# Patient Record
Sex: MALE | Race: WHITE | Employment: OTHER | URBAN - METROPOLITAN AREA
[De-identification: names, ages, dates, MRNs, and addresses within clinical notes are randomized per-mention and may not be internally consistent; named-entity substitution may affect disease eponyms.]

---

## 2020-02-17 ENCOUNTER — APPOINTMENT (OUTPATIENT)
Dept: MRI IMAGING | Facility: HOSPITAL | Age: 70
DRG: 037 | End: 2020-02-17
Attending: EMERGENCY MEDICINE
Payer: MEDICARE

## 2020-02-17 ENCOUNTER — HOSPITAL ENCOUNTER (INPATIENT)
Facility: HOSPITAL | Age: 70
LOS: 7 days | Discharge: HOME | DRG: 037 | End: 2020-02-25
Attending: EMERGENCY MEDICINE | Admitting: HOSPITALIST
Payer: MEDICARE

## 2020-02-17 ENCOUNTER — HOSPITAL ENCOUNTER (OUTPATIENT)
Age: 70
Discharge: ANOTHER HOSPITAL | DRG: 037 | End: 2020-02-17
Payer: MEDICARE

## 2020-02-17 VITALS
OXYGEN SATURATION: 95 % | DIASTOLIC BLOOD PRESSURE: 78 MMHG | WEIGHT: 180 LBS | HEART RATE: 69 BPM | TEMPERATURE: 99 F | HEIGHT: 68 IN | SYSTOLIC BLOOD PRESSURE: 136 MMHG | RESPIRATION RATE: 18 BRPM | BODY MASS INDEX: 27.28 KG/M2

## 2020-02-17 DIAGNOSIS — I63.511 ACUTE ISCHEMIC RIGHT MCA STROKE (HCC): Primary | ICD-10-CM

## 2020-02-17 DIAGNOSIS — R41.0 ACUTE CONFUSION: ICD-10-CM

## 2020-02-17 DIAGNOSIS — R41.0 DISORIENTATED: ICD-10-CM

## 2020-02-17 DIAGNOSIS — R41.82 ALTERED MENTAL STATUS, UNSPECIFIED ALTERED MENTAL STATUS TYPE: Primary | ICD-10-CM

## 2020-02-17 DIAGNOSIS — R53.83 LETHARGIC: ICD-10-CM

## 2020-02-17 PROBLEM — R39.11 BENIGN PROSTATIC HYPERPLASIA WITH URINARY HESITANCY: Chronic | Status: ACTIVE | Noted: 2020-02-17

## 2020-02-17 PROBLEM — N40.1 BENIGN PROSTATIC HYPERPLASIA WITH URINARY HESITANCY: Chronic | Status: ACTIVE | Noted: 2020-02-17

## 2020-02-17 LAB
ALBUMIN SERPL-MCNC: 3.6 G/DL (ref 3.4–5)
ALBUMIN/GLOB SERPL: 0.9 {RATIO} (ref 1–2)
ALP LIVER SERPL-CCNC: 77 U/L (ref 45–117)
ALT SERPL-CCNC: 21 U/L (ref 16–61)
ANION GAP SERPL CALC-SCNC: 2 MMOL/L (ref 0–18)
APTT PPP: 25.8 SECONDS (ref 25.4–36.1)
AST SERPL-CCNC: 16 U/L (ref 15–37)
ATRIAL RATE: 57 BPM
BASOPHILS # BLD AUTO: 0.02 X10(3) UL (ref 0–0.2)
BASOPHILS NFR BLD AUTO: 0.3 %
BILIRUB SERPL-MCNC: 0.6 MG/DL (ref 0.1–2)
BUN BLD-MCNC: 15 MG/DL (ref 7–18)
BUN/CREAT SERPL: 16.5 (ref 10–20)
CALCIUM BLD-MCNC: 8.9 MG/DL (ref 8.5–10.1)
CHLORIDE SERPL-SCNC: 109 MMOL/L (ref 98–112)
CHOLEST SMN-MCNC: 180 MG/DL (ref ?–200)
CO2 SERPL-SCNC: 30 MMOL/L (ref 21–32)
CREAT BLD-MCNC: 0.91 MG/DL (ref 0.7–1.3)
DEPRECATED RDW RBC AUTO: 39.6 FL (ref 35.1–46.3)
EOSINOPHIL # BLD AUTO: 0.11 X10(3) UL (ref 0–0.7)
EOSINOPHIL NFR BLD AUTO: 1.7 %
ERYTHROCYTE [DISTWIDTH] IN BLOOD BY AUTOMATED COUNT: 11.8 % (ref 11–15)
EST. AVERAGE GLUCOSE BLD GHB EST-MCNC: 114 MG/DL (ref 68–126)
ETHANOL SERPL-MCNC: <3 MG/DL (ref ?–3)
GLOBULIN PLAS-MCNC: 3.8 G/DL (ref 2.8–4.4)
GLUCOSE BLD-MCNC: 145 MG/DL (ref 70–99)
GLUCOSE BLD-MCNC: 87 MG/DL (ref 70–99)
HAV IGM SER QL: 2.2 MG/DL (ref 1.6–2.6)
HBA1C MFR BLD HPLC: 5.6 % (ref ?–5.7)
HCT VFR BLD AUTO: 43.6 % (ref 39–53)
HDLC SERPL-MCNC: 38 MG/DL (ref 40–59)
HGB BLD-MCNC: 14.8 G/DL (ref 13–17.5)
IMM GRANULOCYTES # BLD AUTO: 0.02 X10(3) UL (ref 0–1)
IMM GRANULOCYTES NFR BLD: 0.3 %
INR BLD: 0.95 (ref 0.9–1.1)
LDLC SERPL CALC-MCNC: 117 MG/DL (ref ?–100)
LYMPHOCYTES # BLD AUTO: 1.51 X10(3) UL (ref 1–4)
LYMPHOCYTES NFR BLD AUTO: 23.7 %
M PROTEIN MFR SERPL ELPH: 7.4 G/DL (ref 6.4–8.2)
MCH RBC QN AUTO: 30.8 PG (ref 26–34)
MCHC RBC AUTO-ENTMCNC: 33.9 G/DL (ref 31–37)
MCV RBC AUTO: 90.8 FL (ref 80–100)
MONOCYTES # BLD AUTO: 0.73 X10(3) UL (ref 0.1–1)
MONOCYTES NFR BLD AUTO: 11.5 %
NEUTROPHILS # BLD AUTO: 3.97 X10 (3) UL (ref 1.5–7.7)
NEUTROPHILS # BLD AUTO: 3.97 X10(3) UL (ref 1.5–7.7)
NEUTROPHILS NFR BLD AUTO: 62.5 %
NONHDLC SERPL-MCNC: 142 MG/DL (ref ?–130)
OSMOLALITY SERPL CALC.SUM OF ELEC: 292 MOSM/KG (ref 275–295)
P AXIS: 29 DEGREES
P-R INTERVAL: 148 MS
PLATELET # BLD AUTO: 224 10(3)UL (ref 150–450)
POTASSIUM SERPL-SCNC: 4.1 MMOL/L (ref 3.5–5.1)
PSA SERPL DL<=0.01 NG/ML-MCNC: 13.1 SECONDS (ref 12.5–14.7)
Q-T INTERVAL: 436 MS
QRS DURATION: 104 MS
QTC CALCULATION (BEZET): 424 MS
R AXIS: 44 DEGREES
RBC # BLD AUTO: 4.8 X10(6)UL (ref 3.8–5.8)
SODIUM SERPL-SCNC: 141 MMOL/L (ref 136–145)
T AXIS: 20 DEGREES
TRIGL SERPL-MCNC: 125 MG/DL (ref 30–149)
TROPONIN I SERPL-MCNC: <0.045 NG/ML (ref ?–0.04)
VENTRICULAR RATE: 57 BPM
VLDLC SERPL CALC-MCNC: 25 MG/DL (ref 0–30)
WBC # BLD AUTO: 6.4 X10(3) UL (ref 4–11)

## 2020-02-17 PROCEDURE — 70546 MR ANGIOGRAPH HEAD W/O&W/DYE: CPT | Performed by: EMERGENCY MEDICINE

## 2020-02-17 PROCEDURE — A9575 INJ GADOTERATE MEGLUMI 0.1ML: HCPCS

## 2020-02-17 PROCEDURE — 70549 MR ANGIOGRAPH NECK W/O&W/DYE: CPT | Performed by: EMERGENCY MEDICINE

## 2020-02-17 PROCEDURE — 99203 OFFICE O/P NEW LOW 30 MIN: CPT

## 2020-02-17 PROCEDURE — 70553 MRI BRAIN STEM W/O & W/DYE: CPT | Performed by: EMERGENCY MEDICINE

## 2020-02-17 PROCEDURE — 99223 1ST HOSP IP/OBS HIGH 75: CPT | Performed by: HOSPITALIST

## 2020-02-17 PROCEDURE — 99202 OFFICE O/P NEW SF 15 MIN: CPT

## 2020-02-17 RX ORDER — ONDANSETRON 2 MG/ML
4 INJECTION INTRAMUSCULAR; INTRAVENOUS EVERY 6 HOURS PRN
Status: DISCONTINUED | OUTPATIENT
Start: 2020-02-17 | End: 2020-02-20

## 2020-02-17 RX ORDER — FAMOTIDINE 20 MG/1
20 TABLET ORAL DAILY
Status: DISCONTINUED | OUTPATIENT
Start: 2020-02-17 | End: 2020-02-20

## 2020-02-17 RX ORDER — METOCLOPRAMIDE HYDROCHLORIDE 5 MG/ML
10 INJECTION INTRAMUSCULAR; INTRAVENOUS EVERY 8 HOURS PRN
Status: DISCONTINUED | OUTPATIENT
Start: 2020-02-17 | End: 2020-02-25

## 2020-02-17 RX ORDER — ATORVASTATIN CALCIUM 80 MG/1
80 TABLET, FILM COATED ORAL NIGHTLY
Status: DISCONTINUED | OUTPATIENT
Start: 2020-02-17 | End: 2020-02-25

## 2020-02-17 RX ORDER — ACETAMINOPHEN 650 MG/1
650 SUPPOSITORY RECTAL EVERY 4 HOURS PRN
Status: DISCONTINUED | OUTPATIENT
Start: 2020-02-17 | End: 2020-02-25

## 2020-02-17 RX ORDER — DUTASTERIDE 0.5 MG/1
0.5 CAPSULE, LIQUID FILLED ORAL DAILY
COMMUNITY
End: 2020-03-16

## 2020-02-17 RX ORDER — SODIUM CHLORIDE 9 MG/ML
INJECTION, SOLUTION INTRAVENOUS CONTINUOUS
Status: ACTIVE | OUTPATIENT
Start: 2020-02-17 | End: 2020-02-17

## 2020-02-17 RX ORDER — BISACODYL 10 MG
10 SUPPOSITORY, RECTAL RECTAL
Status: DISCONTINUED | OUTPATIENT
Start: 2020-02-17 | End: 2020-02-25

## 2020-02-17 RX ORDER — ASPIRIN 300 MG
300 SUPPOSITORY, RECTAL RECTAL DAILY
Status: DISCONTINUED | OUTPATIENT
Start: 2020-02-17 | End: 2020-02-25

## 2020-02-17 RX ORDER — HEPARIN SODIUM 5000 [USP'U]/ML
5000 INJECTION, SOLUTION INTRAVENOUS; SUBCUTANEOUS EVERY 8 HOURS SCHEDULED
Status: DISCONTINUED | OUTPATIENT
Start: 2020-02-17 | End: 2020-02-20

## 2020-02-17 RX ORDER — ASPIRIN 325 MG
325 TABLET ORAL DAILY
Status: DISCONTINUED | OUTPATIENT
Start: 2020-02-17 | End: 2020-02-25

## 2020-02-17 RX ORDER — LABETALOL HYDROCHLORIDE 5 MG/ML
10 INJECTION, SOLUTION INTRAVENOUS EVERY 10 MIN PRN
Status: DISCONTINUED | OUTPATIENT
Start: 2020-02-17 | End: 2020-02-25

## 2020-02-17 RX ORDER — ACETAMINOPHEN 325 MG/1
650 TABLET ORAL EVERY 4 HOURS PRN
Status: DISCONTINUED | OUTPATIENT
Start: 2020-02-17 | End: 2020-02-25

## 2020-02-17 RX ORDER — POLYETHYLENE GLYCOL 3350 17 G/17G
17 POWDER, FOR SOLUTION ORAL DAILY PRN
Status: DISCONTINUED | OUTPATIENT
Start: 2020-02-17 | End: 2020-02-25

## 2020-02-17 RX ORDER — FAMOTIDINE 10 MG/ML
20 INJECTION, SOLUTION INTRAVENOUS DAILY
Status: DISCONTINUED | OUTPATIENT
Start: 2020-02-17 | End: 2020-02-20

## 2020-02-17 RX ORDER — FINASTERIDE 5 MG/1
5 TABLET, FILM COATED ORAL DAILY
Status: DISCONTINUED | OUTPATIENT
Start: 2020-02-17 | End: 2020-02-25

## 2020-02-17 RX ORDER — SODIUM PHOSPHATE, DIBASIC AND SODIUM PHOSPHATE, MONOBASIC 7; 19 G/133ML; G/133ML
1 ENEMA RECTAL ONCE AS NEEDED
Status: DISCONTINUED | OUTPATIENT
Start: 2020-02-17 | End: 2020-02-25

## 2020-02-17 RX ORDER — ACETAMINOPHEN 325 MG/1
650 TABLET ORAL EVERY 6 HOURS PRN
Status: DISCONTINUED | OUTPATIENT
Start: 2020-02-17 | End: 2020-02-17

## 2020-02-17 RX ORDER — ASPIRIN 81 MG/1
324 TABLET, CHEWABLE ORAL ONCE
Status: COMPLETED | OUTPATIENT
Start: 2020-02-17 | End: 2020-02-17

## 2020-02-17 RX ORDER — MULTIVITAMIN WITH FOLIC ACID 400 MCG
1 TABLET ORAL DAILY
COMMUNITY

## 2020-02-17 RX ORDER — ASPIRIN 81 MG/1
81 TABLET ORAL DAILY
Status: ON HOLD | COMMUNITY
End: 2020-02-25

## 2020-02-17 RX ORDER — DOCUSATE SODIUM 100 MG/1
100 CAPSULE, LIQUID FILLED ORAL 2 TIMES DAILY
Status: DISCONTINUED | OUTPATIENT
Start: 2020-02-17 | End: 2020-02-25

## 2020-02-17 RX ORDER — SODIUM CHLORIDE 9 MG/ML
INJECTION, SOLUTION INTRAVENOUS CONTINUOUS
Status: ACTIVE | OUTPATIENT
Start: 2020-02-17 | End: 2020-02-19

## 2020-02-17 NOTE — ED PROVIDER NOTES
The patient's case was discussed with me by JEANNETTE Torres. I interviewed and examined the patient.   Neurologic exam is completely normal but the patient clearly has a history of abnormal mentation yesterday and family states that he is still not back Aspirin was administered. Test results were discussed with Dr. Viviana Ulrich. Patient and family members were apprised of the test results. Patient was transferred to the floor.

## 2020-02-17 NOTE — ED INITIAL ASSESSMENT (HPI)
Wife noted that patient was lethargic, disoriented, confused yesterday. Flew in from [place] last night. Wife states he does not appear to be himself. Patient alert and able to answer questions.

## 2020-02-17 NOTE — ED PROVIDER NOTES
Patient Seen in: [facility] Emergency Department      History   Patient presents with:  Altered Mental Status    Stated Complaint: AMS fro IC, confusion since yesterday, sent to r/o TIA    HPI  46-year-old male who comes in today with his wife and s Drug use: Not on file             Review of Systems   All other systems reviewed and are negative. Positive for stated complaint: AMS fro IC, confusion since yesterday, sent to r/o TIA  Other systems are as noted in HPI.   Constitutional and vital sign deficit. Sensory: No sensory deficit. Motor: No weakness. Coordination: Coordination normal.      Gait: Gait normal.      Deep Tendon Reflexes: Reflexes normal.      Comments: Finger to nose and heel to shin intact. No facial droop.   No ton without and with infusion was performed using 3D time of flight, multi-planar and 3D reconstructed images. All measurements obtained in this exam were performed using NASCET criteria.   PATIENT STATED HISTORY:(As transcribed by Technologist)  Patient had an vertebral artery appears unremarkable. MRA NECK COMMON CAROTID:                 Normal for age with no visible significant stenosis or dissection.  CERVICAL INTERNAL CAROTID:  There is a focus of complete occlusion involving the right proximal internal car Asa given. Disposition and Plan     Clinical Impression:  Acute confusion  (primary encounter diagnosis)    Disposition:  Admit  2/17/2020  2:55 pm    Follow-up:  No follow-up provider specified.       Medications Prescribed:  Current Discharge M

## 2020-02-17 NOTE — ED INITIAL ASSESSMENT (HPI)
A/o x3, pt ambulatory, per family had three episodes of confusion, yesterday, per family better today but IC personal recommended neurological exams to ER.

## 2020-02-17 NOTE — ED PROVIDER NOTES
Patient Seen in: [facility] [place]      History   Patient presents with:  Mental Status Changes    Stated Complaint: disoriented    HPI    80-year-old male who comes in today with his wife and son-in-law with complaints of waking up ye Smoking status: Never Smoker      Smokeless tobacco: Never Used    Alcohol use: Not on file    Drug use: Not on file             Review of Systems    Positive for stated complaint: disoriented  Other systems are as noted in HPI.   Constitutional and vital s Sensory: Sensation is intact. Motor: Motor function is intact. Coordination: Coordination is intact. Gait: Gait is intact. Deep Tendon Reflexes: Reflexes are normal and symmetric.       Comments: Coordination and cerebellar testing is in

## 2020-02-17 NOTE — ED NOTES
Per family, pt has returned to his mental baseline and has not changed from such since his hospital stay today. He is alert and oriented x4 at this time.

## 2020-02-18 ENCOUNTER — APPOINTMENT (OUTPATIENT)
Dept: CV DIAGNOSTICS | Facility: HOSPITAL | Age: 70
DRG: 037 | End: 2020-02-18
Attending: PHYSICIAN ASSISTANT
Payer: MEDICARE

## 2020-02-18 ENCOUNTER — APPOINTMENT (OUTPATIENT)
Dept: CT IMAGING | Facility: HOSPITAL | Age: 70
DRG: 037 | End: 2020-02-18
Attending: HOSPITALIST
Payer: MEDICARE

## 2020-02-18 ENCOUNTER — APPOINTMENT (OUTPATIENT)
Dept: CV DIAGNOSTICS | Facility: HOSPITAL | Age: 70
DRG: 037 | End: 2020-02-18
Attending: HOSPITALIST
Payer: MEDICARE

## 2020-02-18 LAB
ANION GAP SERPL CALC-SCNC: 3 MMOL/L (ref 0–18)
BASOPHILS # BLD AUTO: 0.02 X10(3) UL (ref 0–0.2)
BASOPHILS NFR BLD AUTO: 0.3 %
BUN BLD-MCNC: 13 MG/DL (ref 7–18)
BUN/CREAT SERPL: 14.6 (ref 10–20)
CALCIUM BLD-MCNC: 8.4 MG/DL (ref 8.5–10.1)
CHLORIDE SERPL-SCNC: 110 MMOL/L (ref 98–112)
CO2 SERPL-SCNC: 29 MMOL/L (ref 21–32)
CREAT BLD-MCNC: 0.89 MG/DL (ref 0.7–1.3)
DEPRECATED RDW RBC AUTO: 40.6 FL (ref 35.1–46.3)
EOSINOPHIL # BLD AUTO: 0.18 X10(3) UL (ref 0–0.7)
EOSINOPHIL NFR BLD AUTO: 2.9 %
ERYTHROCYTE [DISTWIDTH] IN BLOOD BY AUTOMATED COUNT: 11.9 % (ref 11–15)
GLUCOSE BLD-MCNC: 100 MG/DL (ref 70–99)
GLUCOSE BLD-MCNC: 110 MG/DL (ref 70–99)
GLUCOSE BLD-MCNC: 116 MG/DL (ref 70–99)
GLUCOSE BLD-MCNC: 84 MG/DL (ref 70–99)
GLUCOSE BLD-MCNC: 99 MG/DL (ref 70–99)
HCT VFR BLD AUTO: 38.5 % (ref 39–53)
HGB BLD-MCNC: 12.9 G/DL (ref 13–17.5)
IMM GRANULOCYTES # BLD AUTO: 0.02 X10(3) UL (ref 0–1)
IMM GRANULOCYTES NFR BLD: 0.3 %
LYMPHOCYTES # BLD AUTO: 1.58 X10(3) UL (ref 1–4)
LYMPHOCYTES NFR BLD AUTO: 25.5 %
MCH RBC QN AUTO: 31 PG (ref 26–34)
MCHC RBC AUTO-ENTMCNC: 33.5 G/DL (ref 31–37)
MCV RBC AUTO: 92.5 FL (ref 80–100)
MONOCYTES # BLD AUTO: 0.73 X10(3) UL (ref 0.1–1)
MONOCYTES NFR BLD AUTO: 11.8 %
NEUTROPHILS # BLD AUTO: 3.66 X10 (3) UL (ref 1.5–7.7)
NEUTROPHILS # BLD AUTO: 3.66 X10(3) UL (ref 1.5–7.7)
NEUTROPHILS NFR BLD AUTO: 59.2 %
OSMOLALITY SERPL CALC.SUM OF ELEC: 295 MOSM/KG (ref 275–295)
PLATELET # BLD AUTO: 209 10(3)UL (ref 150–450)
POTASSIUM SERPL-SCNC: 4.1 MMOL/L (ref 3.5–5.1)
RBC # BLD AUTO: 4.16 X10(6)UL (ref 3.8–5.8)
SODIUM SERPL-SCNC: 142 MMOL/L (ref 136–145)
WBC # BLD AUTO: 6.2 X10(3) UL (ref 4–11)

## 2020-02-18 PROCEDURE — 93306 TTE W/DOPPLER COMPLETE: CPT | Performed by: PHYSICIAN ASSISTANT

## 2020-02-18 PROCEDURE — 70496 CT ANGIOGRAPHY HEAD: CPT | Performed by: HOSPITALIST

## 2020-02-18 PROCEDURE — 70498 CT ANGIOGRAPHY NECK: CPT | Performed by: HOSPITALIST

## 2020-02-18 PROCEDURE — 99232 SBSQ HOSP IP/OBS MODERATE 35: CPT | Performed by: HOSPITALIST

## 2020-02-18 PROCEDURE — 99221 1ST HOSP IP/OBS SF/LOW 40: CPT | Performed by: INTERNAL MEDICINE

## 2020-02-18 PROCEDURE — 99223 1ST HOSP IP/OBS HIGH 75: CPT | Performed by: OTHER

## 2020-02-18 RX ORDER — CLOPIDOGREL BISULFATE 75 MG/1
75 TABLET ORAL DAILY
Status: DISCONTINUED | OUTPATIENT
Start: 2020-02-18 | End: 2020-02-20

## 2020-02-18 NOTE — PLAN OF CARE
Received patient via cart from the ED with AMS that resolved. Alert and oriented x4, wife at bedside. Telemetry monitor reading SB. IVF started. Neuro checks with no deficits. Bedrest. Passed bedside swallow test, medications given.  Plan for Carotid CT and

## 2020-02-18 NOTE — CONSULTS
[address]  Neurology Consultation    Date of consult: 2/18/2020    Reason for consult: stroke    HPI: Bari Naidu is a 78year old male with past medical history as listed below presents with confusion and lethargy.   Patient was out of mg, Intravenous, Q10 Min PRN  atorvastatin (LIPITOR) tab 80 mg, 80 mg, Oral, Nightly  aspirin 300 MG rectal suppository 300 mg, 300 mg, Rectal, Daily    Or  aspirin tab 325 mg, 325 mg, Oral, Daily  famoTIDine (PEPCID) tab 20 mg, 20 mg, Oral, Daily    Or  f 02/18/20  0549   RBC 4.16   HGB 12.9*   HCT 38.5*   MCV 92.5   MCH 31.0   MCHC 33.5   RDW 11.9   NEPRELIM 3.66   WBC 6.2   .0     Recent Labs   Lab 02/17/20  1407 02/18/20  0549   GLU 87 110*   BUN 15 13   CREATSERUM 0.91 0.89   GFRAA 100 102   GFRN

## 2020-02-18 NOTE — CONSULTS
[facility]  Vascular Surgery Consultation    [facility] Moritz Briestensky Patient Status:  Inpatient    1950 MRN [id number]   [facility] 7NE-A Attending Joyce Lopes [address] Day # 0 PCP No primary care provider on file.      Re 1 enema, Rectal, Once PRN  •  0.9% NaCl infusion, , Intravenous, Continuous  •  acetaminophen (TYLENOL) tab 650 mg, 650 mg, Oral, Q4H PRN **OR** acetaminophen (TYLENOL) 650 MG rectal suppository 650 mg, 650 mg, Rectal, Q4H PRN  •  Labetalol HCl (TRANDATE) affect, no sensory or motor deficit        Laboratory Data:  Lab Results   Component Value Date    WBC 6.2 02/18/2020    HGB 12.9 02/18/2020    HCT 38.5 02/18/2020    .0 02/18/2020    CREATSERUM 0.89 02/18/2020    BUN 13 02/18/2020     02/18/2

## 2020-02-18 NOTE — H&P
[facility]  History and Physical     Eleni Valentin Patient Status:  Observation    1950 MRN [id number]   [facility] 7NE-A Attending Kaia Ferreira [address] Day # 0 PCP No primary care provider on file.      Brad Rodrigez daily., Disp: , Rfl:   Multiple Vitamin (TAB-A-DAVE) Oral Tab, Take 1 tablet by mouth daily. , Disp: , Rfl:         Review of Systems:   A comprehensive 14 point review of systems was completed. Pertinent positives and negatives noted in the HPI.     Phys aspirin, statin, echo ordered. 2. Right internal carotid artery occlusion-we will order CTA of head and neck. 3. BPH-we will continue on home medication.     Quality:  · DVT Prophylaxis: Heparin  · CODE status: Full  · Olmedo: None    Plan of care discusse

## 2020-02-18 NOTE — SLP NOTE
ADULT SWALLOWING EVALUATION    ASSESSMENT    ASSESSMENT/OVERALL IMPRESSION:  Pt seen this morning for BSSE per stroke protocol. Nurse approved this evaluation. Pt's wife was present.  Pt is a 79 y/o M who was admitted to [facility] on 2/17/2020 after e diet.     * ST to f/u with pt to complete cognitive-communication evaluation per stroke protocol.     Compensatory Strategies Recommended: (N/A)  Aspiration Precautions: (N/A)  Medication Administration Recommendations: No restrictions     Treatment Plan/Re Dr. Heydi De Anda on 2/17/2020 at 1735 hours. Read back was performed. \"    OBJECTIVE   ORAL MOTOR EXAMINATION  Dentition: Natural  Symmetry: Within Functional Limits  Strength:  Within Functional Limits  Tone: Within Functional Limits  Range of Motion: Within

## 2020-02-18 NOTE — CM/SW NOTE
Pt admitted for stroke. Pt on CVA protocol. Pt lives in [place] and was visiting his dtr here in [place]. Pt has no deficits from CVA. PT/OT to see. No needs anticipated.

## 2020-02-18 NOTE — ED NOTES
Report called to Terrence Wright RN [id number] for room 7756. Will hold patient until inpatient room is ready.

## 2020-02-19 ENCOUNTER — ANESTHESIA EVENT (OUTPATIENT)
Dept: CARDIAC SURGERY | Facility: HOSPITAL | Age: 70
DRG: 037 | End: 2020-02-19
Payer: MEDICARE

## 2020-02-19 LAB
GLUCOSE BLD-MCNC: 101 MG/DL (ref 70–99)
GLUCOSE BLD-MCNC: 74 MG/DL (ref 70–99)
GLUCOSE BLD-MCNC: 81 MG/DL (ref 70–99)
GLUCOSE BLD-MCNC: 91 MG/DL (ref 70–99)

## 2020-02-19 PROCEDURE — 99232 SBSQ HOSP IP/OBS MODERATE 35: CPT | Performed by: STUDENT IN AN ORGANIZED HEALTH CARE EDUCATION/TRAINING PROGRAM

## 2020-02-19 PROCEDURE — 99232 SBSQ HOSP IP/OBS MODERATE 35: CPT | Performed by: NURSE PRACTITIONER

## 2020-02-19 PROCEDURE — 99233 SBSQ HOSP IP/OBS HIGH 50: CPT | Performed by: OTHER

## 2020-02-19 NOTE — PLAN OF CARE
Assumed care at [address] [facility]. Patient A&Ox4. Neuros q 4, no deficits. Denies pain. Ambulated halls. Plan for Rt CEA Thursday. Will cont to monitor.

## 2020-02-19 NOTE — PROGRESS NOTES
[facility]  Progress Note     Kit Mu Patient Status:  Inpatient    1950 MRN [id number]   [facility] 7NE-A Attending Geno Hollins [address] Day # 0 PCP No primary care provider on file.      Chief Complain Subcutaneous Q8H Bella 62   • docusate sodium  100 mg Oral BID   • atorvastatin  80 mg Oral Nightly   • aspirin  300 mg Rectal Daily    Or   • aspirin  325 mg Oral Daily   • famoTIDine  20 mg Oral Daily    Or   • famoTIDine  20 mg Intravenous Daily       ASSESS

## 2020-02-19 NOTE — PLAN OF CARE
Assumed care @ 0700  L field cut and facial droop present. No further neuro deficits  Ambulated in the savage with therapy. Up to the chair  Spouse at bedside.  Updated on Vene 89 for R CEA tomorrow at [facility]

## 2020-02-19 NOTE — PROGRESS NOTES
[address] Neurology Progress Note    Candace Frazier [facility] Patient Status:  Inpatient    1950 MRN [id number]   [facility] [place] [facility] 7NE-A Attending Anita Molina MD   Hosp Day # 1 PCP No primary care provider on file.          Subjecti is a focus of complete occlusion of the proximal, extracranial right internal carotid artery. There is also a focus of complete occlusion involving the intracranial portion of the right internal carotid artery.   Attenuated flow within the right   middle c Surgery consulted, appreciate recs, plan for R CEA on 2/20 and cleared by Cardiology for surgery      Anita Fowler, [address]  2/19/2020, 9:51 AM  Spectre [id number]  Neurology Attending Addendum:  I have seen patient independently, r

## 2020-02-19 NOTE — PHYSICAL THERAPY NOTE
PHYSICAL THERAPY EVALUATION - INPATIENT     Room Number: 1178/5921-D  Evaluation Date: 2/19/2020  Type of Evaluation: Initial  Physician Order: PT Eval and Treat    Presenting Problem: R MCA stroke     Reason for Therapy: Mobility Dysfunction and Dis of high-grade stenosis involving the right internal carotid artery shortly after its origin over a segment measuring approximately 1.2 cm. This is difficult to measure given the high-grade stenosis as the lumen appears string   like in this region.   ASHLEY Retana without difficulty  · Awareness of Errors:  decreased awareness of errors   · Awareness of Deficits:  decreased awareness of deficits    RANGE OF MOTION AND STRENGTH ASSESSMENT  Upper extremity ROM and strength: see OT note    Lower extremity ROM is within slow to respond. Pt completes bed mobility ind. Sit to stand ind. Gait x 200 ft in halls, no LOB, however does not attend to objects on left side at times without cueing. Cueing to physically turn to read room numbers on left side.  Returns to room and comp demonstrating a 20.91% degree of impairment in mobility. D/C recs pending following procedure scheduled 2/20/20 for R CEA. Likely to benefit from OP PT, but will continue to assess following procedure.    Based on this evaluation, patient's clinical present

## 2020-02-19 NOTE — PROGRESS NOTES
[facility]  Cardiology Progress Note    [facility] Patient Status:  Inpatient    1950 MRN [id number]   [facility] 7NE-A Attending Elvie Prater MD   Hosp Day # 1 PCP No primary care provider on file.      Subjective:  No rate and rhythm, S1, S2 normal, no murmur, rub or gallop. Lungs: Clear without wheezes, rales, rhonchi or dullness. Normal excursions and effort. Abdomen: Soft, non-tender. Extremities: Without clubbing, cyanosis or edema. Peripheral pulses are 2+.

## 2020-02-19 NOTE — PROGRESS NOTES
[facility]  Progress Note     Geoff Solo Patient Status:  Inpatient    1950 MRN [id number]   [facility] [place] [facility] 7NE-A Attending Sergio Conner MD   Hosp Day # 1 PCP No primary care provider on file.      Chief Complaint: Acute Sodium (Porcine)  5,000 Units Subcutaneous [facility]   • docusate sodium  100 mg Oral BID   • atorvastatin  80 mg Oral Nightly   • aspirin  300 mg Rectal Daily    Or   • aspirin  325 mg Oral Daily   • famoTIDine  20 mg Oral Daily    Or   • famoTIDine  20 mg

## 2020-02-19 NOTE — PLAN OF CARE
Assumed care of pt @ 0730. Pt is A/Ox4, VSS. No neuro deficits. Wife and other family at bedside throughout day. Pt aware of plan of CEA on Thursday with Dr Nabila Richard. Pt up ad roger. Pt tolerating diet.  Echo with bubbles and CT of carotids done this AM. [facility] anti-seizure medications as ordered  - Monitor neurological status  Outcome: Progressing  Goal: Remains free of injury related to seizure activity  Description  INTERVENTIONS:  - Maintain airway, patient safety  and administer oxygen as ordered  - Monitor

## 2020-02-19 NOTE — ANESTHESIA PREPROCEDURE EVALUATION
PRE-OP EVALUATION    Patient Name: Toni Rogers    Pre-op Diagnosis: symptomatic carotid    Procedure(s):  right carotid endarterectomy      7613  SA req'd    Surgeon(s) and Role:     Deuce Umanzor MD - Primary    Pre-op vitals reviewed.   Tem injection 20 mg, 20 mg, Intravenous, Daily        Outpatient Medications:  aspirin 81 MG Oral Tab EC, Take 81 mg by mouth daily. , Disp: , Rfl:   Dutasteride 0.5 MG Oral Cap, Take 0.5 mg by mouth daily. , Disp: , Rfl:   Multiple Vitamin (TAB-A-DAVE) Oral T Dental             Pulmonary    Pulmonary exam normal.                 Other findings            ASA: 3   Plan: general  NPO status verified and patient meets guidelines. Post-procedure pain management plan discussed with surgeon and patient.       Kim

## 2020-02-20 ENCOUNTER — ANESTHESIA (OUTPATIENT)
Dept: CARDIAC SURGERY | Facility: HOSPITAL | Age: 70
DRG: 037 | End: 2020-02-20
Payer: MEDICARE

## 2020-02-20 ENCOUNTER — APPOINTMENT (OUTPATIENT)
Dept: GENERAL RADIOLOGY | Facility: HOSPITAL | Age: 70
DRG: 037 | End: 2020-02-20
Attending: SURGERY
Payer: MEDICARE

## 2020-02-20 ENCOUNTER — APPOINTMENT (OUTPATIENT)
Dept: CT IMAGING | Facility: HOSPITAL | Age: 70
DRG: 037 | End: 2020-02-20
Attending: SURGERY
Payer: MEDICARE

## 2020-02-20 LAB
ANION GAP SERPL CALC-SCNC: 5 MMOL/L (ref 0–18)
APTT PPP: 83.4 SECONDS (ref 25.4–36.1)
ATRIAL RATE: 71 BPM
BUN BLD-MCNC: 9 MG/DL (ref 7–18)
BUN/CREAT SERPL: 9.2 (ref 10–20)
CALCIUM BLD-MCNC: 8.4 MG/DL (ref 8.5–10.1)
CHLORIDE SERPL-SCNC: 113 MMOL/L (ref 98–112)
CO2 SERPL-SCNC: 26 MMOL/L (ref 21–32)
CREAT BLD-MCNC: 0.98 MG/DL (ref 0.7–1.3)
DEPRECATED RDW RBC AUTO: 40.3 FL (ref 35.1–46.3)
ERYTHROCYTE [DISTWIDTH] IN BLOOD BY AUTOMATED COUNT: 12 % (ref 11–15)
GLUCOSE BLD-MCNC: 140 MG/DL (ref 70–99)
HCT VFR BLD AUTO: 42.5 % (ref 39–53)
HGB BLD-MCNC: 14.3 G/DL (ref 13–17.5)
INR BLD: 1.07 (ref 0.9–1.1)
MCH RBC QN AUTO: 30.9 PG (ref 26–34)
MCHC RBC AUTO-ENTMCNC: 33.6 G/DL (ref 31–37)
MCV RBC AUTO: 91.8 FL (ref 80–100)
OSMOLALITY SERPL CALC.SUM OF ELEC: 299 MOSM/KG (ref 275–295)
P AXIS: 71 DEGREES
P-R INTERVAL: 158 MS
PLATELET # BLD AUTO: 248 10(3)UL (ref 150–450)
POTASSIUM SERPL-SCNC: 4.1 MMOL/L (ref 3.5–5.1)
PSA SERPL DL<=0.01 NG/ML-MCNC: 14.4 SECONDS (ref 12.5–14.7)
Q-T INTERVAL: 422 MS
QRS DURATION: 90 MS
QTC CALCULATION (BEZET): 458 MS
R AXIS: 64 DEGREES
RBC # BLD AUTO: 4.63 X10(6)UL (ref 3.8–5.8)
SODIUM SERPL-SCNC: 144 MMOL/L (ref 136–145)
T AXIS: 43 DEGREES
VENTRICULAR RATE: 71 BPM
WBC # BLD AUTO: 18.4 X10(3) UL (ref 4–11)

## 2020-02-20 PROCEDURE — 76000 FLUOROSCOPY <1 HR PHYS/QHP: CPT | Performed by: SURGERY

## 2020-02-20 PROCEDURE — 03CK0ZZ EXTIRPATION OF MATTER FROM RIGHT INTERNAL CAROTID ARTERY, OPEN APPROACH: ICD-10-PCS | Performed by: SURGERY

## 2020-02-20 PROCEDURE — 99291 CRITICAL CARE FIRST HOUR: CPT | Performed by: INTERNAL MEDICINE

## 2020-02-20 PROCEDURE — B3161ZZ FLUOROSCOPY OF RIGHT INTERNAL CAROTID ARTERY USING LOW OSMOLAR CONTRAST: ICD-10-PCS | Performed by: SURGERY

## 2020-02-20 PROCEDURE — 70450 CT HEAD/BRAIN W/O DYE: CPT | Performed by: SURGERY

## 2020-02-20 PROCEDURE — 99232 SBSQ HOSP IP/OBS MODERATE 35: CPT | Performed by: NURSE PRACTITIONER

## 2020-02-20 PROCEDURE — 03UK0KZ SUPPLEMENT RIGHT INTERNAL CAROTID ARTERY WITH NONAUTOLOGOUS TISSUE SUBSTITUTE, OPEN APPROACH: ICD-10-PCS | Performed by: SURGERY

## 2020-02-20 DEVICE — XENOSURE BIOLOGIC PATCH, 0.8CM X 8CM, EIFU
Type: IMPLANTABLE DEVICE | Site: NECK | Status: FUNCTIONAL
Brand: XENOSURE BIOLOGIC PATCH

## 2020-02-20 RX ORDER — HYDROCODONE BITARTRATE AND ACETAMINOPHEN 5; 325 MG/1; MG/1
1 TABLET ORAL EVERY 4 HOURS PRN
Status: DISCONTINUED | OUTPATIENT
Start: 2020-02-20 | End: 2020-02-25

## 2020-02-20 RX ORDER — MORPHINE SULFATE 4 MG/ML
8 INJECTION, SOLUTION INTRAMUSCULAR; INTRAVENOUS
Status: DISCONTINUED | OUTPATIENT
Start: 2020-02-20 | End: 2020-02-25

## 2020-02-20 RX ORDER — METOCLOPRAMIDE HYDROCHLORIDE 5 MG/ML
INJECTION INTRAMUSCULAR; INTRAVENOUS AS NEEDED
Status: DISCONTINUED | OUTPATIENT
Start: 2020-02-20 | End: 2020-02-20 | Stop reason: SURG

## 2020-02-20 RX ORDER — HYDROCODONE BITARTRATE AND ACETAMINOPHEN 10; 325 MG/1; MG/1
1 TABLET ORAL EVERY 4 HOURS PRN
Status: DISCONTINUED | OUTPATIENT
Start: 2020-02-20 | End: 2020-02-25

## 2020-02-20 RX ORDER — MORPHINE SULFATE 4 MG/ML
2 INJECTION, SOLUTION INTRAMUSCULAR; INTRAVENOUS
Status: DISCONTINUED | OUTPATIENT
Start: 2020-02-20 | End: 2020-02-25

## 2020-02-20 RX ORDER — ONDANSETRON 2 MG/ML
4 INJECTION INTRAMUSCULAR; INTRAVENOUS EVERY 6 HOURS PRN
Status: DISCONTINUED | OUTPATIENT
Start: 2020-02-20 | End: 2020-02-25

## 2020-02-20 RX ORDER — MIDAZOLAM HYDROCHLORIDE 1 MG/ML
INJECTION INTRAMUSCULAR; INTRAVENOUS AS NEEDED
Status: DISCONTINUED | OUTPATIENT
Start: 2020-02-20 | End: 2020-02-20 | Stop reason: SURG

## 2020-02-20 RX ORDER — PROTAMINE SULFATE 10 MG/ML
INJECTION, SOLUTION INTRAVENOUS AS NEEDED
Status: DISCONTINUED | OUTPATIENT
Start: 2020-02-20 | End: 2020-02-20 | Stop reason: SURG

## 2020-02-20 RX ORDER — PHENYLEPHRINE HCL IN 0.9% NACL 50MG/250ML
PLASTIC BAG, INJECTION (ML) INTRAVENOUS CONTINUOUS
Status: DISCONTINUED | OUTPATIENT
Start: 2020-02-20 | End: 2020-02-25

## 2020-02-20 RX ORDER — MEPERIDINE HYDROCHLORIDE 25 MG/ML
25 INJECTION INTRAMUSCULAR; INTRAVENOUS; SUBCUTANEOUS
Status: DISCONTINUED | OUTPATIENT
Start: 2020-02-20 | End: 2020-02-20

## 2020-02-20 RX ORDER — SODIUM CHLORIDE, SODIUM LACTATE, POTASSIUM CHLORIDE, CALCIUM CHLORIDE 600; 310; 30; 20 MG/100ML; MG/100ML; MG/100ML; MG/100ML
INJECTION, SOLUTION INTRAVENOUS CONTINUOUS
Status: DISCONTINUED | OUTPATIENT
Start: 2020-02-20 | End: 2020-02-25

## 2020-02-20 RX ORDER — LABETALOL HYDROCHLORIDE 5 MG/ML
INJECTION, SOLUTION INTRAVENOUS AS NEEDED
Status: DISCONTINUED | OUTPATIENT
Start: 2020-02-20 | End: 2020-02-20 | Stop reason: SURG

## 2020-02-20 RX ORDER — ONDANSETRON 2 MG/ML
4 INJECTION INTRAMUSCULAR; INTRAVENOUS AS NEEDED
Status: DISCONTINUED | OUTPATIENT
Start: 2020-02-20 | End: 2020-02-20

## 2020-02-20 RX ORDER — CEFAZOLIN SODIUM/WATER 2 G/20 ML
SYRINGE (ML) INTRAVENOUS AS NEEDED
Status: DISCONTINUED | OUTPATIENT
Start: 2020-02-20 | End: 2020-02-20 | Stop reason: SURG

## 2020-02-20 RX ORDER — NALOXONE HYDROCHLORIDE 0.4 MG/ML
INJECTION, SOLUTION INTRAMUSCULAR; INTRAVENOUS; SUBCUTANEOUS AS NEEDED
Status: DISCONTINUED | OUTPATIENT
Start: 2020-02-20 | End: 2020-02-20 | Stop reason: SURG

## 2020-02-20 RX ORDER — IBUPROFEN 400 MG/1
400 TABLET ORAL EVERY 6 HOURS PRN
Status: DISCONTINUED | OUTPATIENT
Start: 2020-02-20 | End: 2020-02-25

## 2020-02-20 RX ORDER — SODIUM CHLORIDE, SODIUM LACTATE, POTASSIUM CHLORIDE, CALCIUM CHLORIDE 600; 310; 30; 20 MG/100ML; MG/100ML; MG/100ML; MG/100ML
INJECTION, SOLUTION INTRAVENOUS CONTINUOUS PRN
Status: DISCONTINUED | OUTPATIENT
Start: 2020-02-20 | End: 2020-02-20 | Stop reason: SURG

## 2020-02-20 RX ORDER — SODIUM CHLORIDE 9 MG/ML
INJECTION, SOLUTION INTRAVENOUS CONTINUOUS
Status: DISCONTINUED | OUTPATIENT
Start: 2020-02-20 | End: 2020-02-23

## 2020-02-20 RX ORDER — NALOXONE HYDROCHLORIDE 0.4 MG/ML
80 INJECTION, SOLUTION INTRAMUSCULAR; INTRAVENOUS; SUBCUTANEOUS AS NEEDED
Status: DISCONTINUED | OUTPATIENT
Start: 2020-02-20 | End: 2020-02-20

## 2020-02-20 RX ORDER — BUPIVACAINE HYDROCHLORIDE 5 MG/ML
INJECTION, SOLUTION EPIDURAL; INTRACAUDAL AS NEEDED
Status: DISCONTINUED | OUTPATIENT
Start: 2020-02-20 | End: 2020-02-20 | Stop reason: HOSPADM

## 2020-02-20 RX ORDER — SODIUM CHLORIDE 9 MG/ML
INJECTION, SOLUTION INTRAVENOUS CONTINUOUS PRN
Status: DISCONTINUED | OUTPATIENT
Start: 2020-02-20 | End: 2020-02-20 | Stop reason: SURG

## 2020-02-20 RX ORDER — PHENYLEPHRINE HCL IN 0.9% NACL 50MG/250ML
PLASTIC BAG, INJECTION (ML) INTRAVENOUS CONTINUOUS PRN
Status: DISCONTINUED | OUTPATIENT
Start: 2020-02-20 | End: 2020-02-20 | Stop reason: SURG

## 2020-02-20 RX ORDER — IBUPROFEN 600 MG/1
600 TABLET ORAL EVERY 6 HOURS PRN
Status: DISCONTINUED | OUTPATIENT
Start: 2020-02-20 | End: 2020-02-25

## 2020-02-20 RX ORDER — CEFAZOLIN SODIUM/WATER 2 G/20 ML
2 SYRINGE (ML) INTRAVENOUS EVERY 8 HOURS
Status: DISCONTINUED | OUTPATIENT
Start: 2020-02-20 | End: 2020-02-20

## 2020-02-20 RX ORDER — FLUMAZENIL 0.1 MG/ML
INJECTION, SOLUTION INTRAVENOUS AS NEEDED
Status: DISCONTINUED | OUTPATIENT
Start: 2020-02-20 | End: 2020-02-20 | Stop reason: SURG

## 2020-02-20 RX ORDER — EPHEDRINE SULFATE 50 MG/ML
INJECTION, SOLUTION INTRAVENOUS AS NEEDED
Status: DISCONTINUED | OUTPATIENT
Start: 2020-02-20 | End: 2020-02-20 | Stop reason: SURG

## 2020-02-20 RX ORDER — ONDANSETRON 2 MG/ML
INJECTION INTRAMUSCULAR; INTRAVENOUS AS NEEDED
Status: DISCONTINUED | OUTPATIENT
Start: 2020-02-20 | End: 2020-02-20 | Stop reason: SURG

## 2020-02-20 RX ORDER — HEPARIN SODIUM 1000 [USP'U]/ML
INJECTION, SOLUTION INTRAVENOUS; SUBCUTANEOUS AS NEEDED
Status: DISCONTINUED | OUTPATIENT
Start: 2020-02-20 | End: 2020-02-20 | Stop reason: SURG

## 2020-02-20 RX ORDER — GLYCOPYRROLATE 0.2 MG/ML
INJECTION, SOLUTION INTRAMUSCULAR; INTRAVENOUS AS NEEDED
Status: DISCONTINUED | OUTPATIENT
Start: 2020-02-20 | End: 2020-02-20 | Stop reason: SURG

## 2020-02-20 RX ORDER — DEXAMETHASONE SODIUM PHOSPHATE 4 MG/ML
VIAL (ML) INJECTION AS NEEDED
Status: DISCONTINUED | OUTPATIENT
Start: 2020-02-20 | End: 2020-02-20 | Stop reason: SURG

## 2020-02-20 RX ORDER — HYDROMORPHONE HYDROCHLORIDE 1 MG/ML
0.5 INJECTION, SOLUTION INTRAMUSCULAR; INTRAVENOUS; SUBCUTANEOUS EVERY 5 MIN PRN
Status: DISCONTINUED | OUTPATIENT
Start: 2020-02-20 | End: 2020-02-20

## 2020-02-20 RX ORDER — MORPHINE SULFATE 4 MG/ML
4 INJECTION, SOLUTION INTRAMUSCULAR; INTRAVENOUS
Status: DISCONTINUED | OUTPATIENT
Start: 2020-02-20 | End: 2020-02-25

## 2020-02-20 RX ORDER — ROCURONIUM BROMIDE 10 MG/ML
INJECTION, SOLUTION INTRAVENOUS AS NEEDED
Status: DISCONTINUED | OUTPATIENT
Start: 2020-02-20 | End: 2020-02-20 | Stop reason: SURG

## 2020-02-20 RX ORDER — MIDAZOLAM HYDROCHLORIDE 1 MG/ML
1 INJECTION INTRAMUSCULAR; INTRAVENOUS EVERY 5 MIN PRN
Status: DISCONTINUED | OUTPATIENT
Start: 2020-02-20 | End: 2020-02-20

## 2020-02-20 RX ADMIN — FLUMAZENIL 0.5 MG: 0.1 INJECTION, SOLUTION INTRAVENOUS at 10:15:00

## 2020-02-20 RX ADMIN — LABETALOL HYDROCHLORIDE 20 MG: 5 INJECTION, SOLUTION INTRAVENOUS at 10:40:00

## 2020-02-20 RX ADMIN — GLYCOPYRROLATE 0.2 MG: 0.2 INJECTION, SOLUTION INTRAMUSCULAR; INTRAVENOUS at 07:10:00

## 2020-02-20 RX ADMIN — EPHEDRINE SULFATE 10 MG: 50 INJECTION, SOLUTION INTRAVENOUS at 07:10:00

## 2020-02-20 RX ADMIN — SODIUM CHLORIDE: 9 INJECTION, SOLUTION INTRAVENOUS at 11:35:00

## 2020-02-20 RX ADMIN — ROCURONIUM BROMIDE 50 MG: 10 INJECTION, SOLUTION INTRAVENOUS at 07:15:00

## 2020-02-20 RX ADMIN — SODIUM CHLORIDE, SODIUM LACTATE, POTASSIUM CHLORIDE, CALCIUM CHLORIDE: 600; 310; 30; 20 INJECTION, SOLUTION INTRAVENOUS at 11:34:00

## 2020-02-20 RX ADMIN — EPHEDRINE SULFATE 10 MG: 50 INJECTION, SOLUTION INTRAVENOUS at 07:12:00

## 2020-02-20 RX ADMIN — PHENYLEPHRINE HCL IN 0.9% NACL 10 MG/MIN: 50MG/250ML PLASTIC BAG, INJECTION (ML) INTRAVENOUS at 10:00:00

## 2020-02-20 RX ADMIN — HEPARIN SODIUM 8000 UNITS: 1000 INJECTION, SOLUTION INTRAVENOUS; SUBCUTANEOUS at 08:08:00

## 2020-02-20 RX ADMIN — EPHEDRINE SULFATE 10 MG: 50 INJECTION, SOLUTION INTRAVENOUS at 07:08:00

## 2020-02-20 RX ADMIN — PROTAMINE SULFATE 50 MG: 10 INJECTION, SOLUTION INTRAVENOUS at 09:26:00

## 2020-02-20 RX ADMIN — PHENYLEPHRINE HCL IN 0.9% NACL 20 MG/MIN: 50MG/250ML PLASTIC BAG, INJECTION (ML) INTRAVENOUS at 11:45:00

## 2020-02-20 RX ADMIN — NALOXONE HYDROCHLORIDE 0.4 MG: 0.4 INJECTION, SOLUTION INTRAMUSCULAR; INTRAVENOUS; SUBCUTANEOUS at 10:25:00

## 2020-02-20 RX ADMIN — SODIUM CHLORIDE: 9 INJECTION, SOLUTION INTRAVENOUS at 06:53:00

## 2020-02-20 RX ADMIN — PHENYLEPHRINE HCL IN 0.9% NACL 20 MG/MIN: 50MG/250ML PLASTIC BAG, INJECTION (ML) INTRAVENOUS at 09:36:00

## 2020-02-20 RX ADMIN — CEFAZOLIN SODIUM/WATER 2 G: 2 G/20 ML SYRINGE (ML) INTRAVENOUS at 07:13:00

## 2020-02-20 RX ADMIN — ROCURONIUM BROMIDE 50 MG: 10 INJECTION, SOLUTION INTRAVENOUS at 10:45:00

## 2020-02-20 RX ADMIN — PHENYLEPHRINE HCL IN 0.9% NACL 20 MG/MIN: 50MG/250ML PLASTIC BAG, INJECTION (ML) INTRAVENOUS at 10:55:00

## 2020-02-20 RX ADMIN — CEFAZOLIN SODIUM/WATER 2 G: 2 G/20 ML SYRINGE (ML) INTRAVENOUS at 11:01:00

## 2020-02-20 RX ADMIN — GLYCOPYRROLATE 0.2 MG: 0.2 INJECTION, SOLUTION INTRAMUSCULAR; INTRAVENOUS at 07:08:00

## 2020-02-20 RX ADMIN — METOCLOPRAMIDE HYDROCHLORIDE 10 MG: 5 INJECTION INTRAMUSCULAR; INTRAVENOUS at 07:15:00

## 2020-02-20 RX ADMIN — SODIUM CHLORIDE, SODIUM LACTATE, POTASSIUM CHLORIDE, CALCIUM CHLORIDE: 600; 310; 30; 20 INJECTION, SOLUTION INTRAVENOUS at 06:57:00

## 2020-02-20 RX ADMIN — DEXAMETHASONE SODIUM PHOSPHATE 4 MG: 4 MG/ML VIAL (ML) INJECTION at 11:24:00

## 2020-02-20 RX ADMIN — MIDAZOLAM HYDROCHLORIDE 2 MG: 1 INJECTION INTRAMUSCULAR; INTRAVENOUS at 06:53:00

## 2020-02-20 RX ADMIN — ONDANSETRON 4 MG: 2 INJECTION INTRAMUSCULAR; INTRAVENOUS at 11:24:00

## 2020-02-20 RX ADMIN — HEPARIN SODIUM 5000 UNITS: 1000 INJECTION, SOLUTION INTRAVENOUS; SUBCUTANEOUS at 10:56:00

## 2020-02-20 NOTE — ANESTHESIA PROCEDURE NOTES
Airway  Date/Time: 2/20/2020 6:55 AM  Urgency: elective    Airway not difficult    General Information and Staff    Patient location during procedure: OR  Anesthesiologist: Sunita Matias MD  Performed: anesthesiologist     Indications and Patient Conditi

## 2020-02-20 NOTE — PROGRESS NOTES
[facility]  Neurocritical Care Progress Note     Kit Mu Patient Status:  Inpatient    1950 MRN [id number]   [facility] 6NE-A Attending Guillermo Dozier MD   Hosp Day # 2 PCP No primary care provider on f mg/hr, Intravenous, Continuous  ceFAZolin sodium (ANCEF/KEFZOL) 2 GM/20ML premix IV syringe 2 g, 2 g, Intravenous, Q8H  HYDROcodone-acetaminophen (NORCO) 5-325 MG per tab 1 tablet, 1 tablet, Oral, Q4H PRN    Or  HYDROcodone-acetaminophen (NORCO)  MG reactive to light, extraocular muscles intact, L facial droop  · Motor- 5/5 on R, 4+/5 on L  · Sens-  Intact to light touch    Diagnostics:   Ct Brain Or Head (46420)    Result Date: 2/20/2020  CONCLUSION:  New findings consistent with petechial hemorrhage me to participate in the care of this patient.      Randal Negron MD  Medical Director of System Neurosciences  Chief, Section of [facility] [place]

## 2020-02-20 NOTE — PROGRESS NOTES
Patient seen and examined  Has left facial droop that is unchanged - smile asymmetric  Tongue midline  Strength on left upper just slightly decreased compared to right    Right neck marked  Indications, risks, benefits discussed  All questions answered

## 2020-02-20 NOTE — PLAN OF CARE
Assumed care at [address]. Patient A&Ox4. Tele SR/SB, on room air. Neuros q 4. Mild left facial droop, left field cut. No acute changes. Denies pain. NPO midnight for CEA tomorrow. Will cont to monitor.

## 2020-02-20 NOTE — PROGRESS NOTES
No complaints  Exam unchanged  Discussed case with Dr. Sherry Rashid  Will proceed with cea tomorrow  All questions answered

## 2020-02-20 NOTE — PROGRESS NOTES
[facility]  Cardiology Critical Care Progress Note    Daphne [facility] Patient Status:  Inpatient    1950 MRN [id number]   [facility] 6NE-A Attending Lizzie Santiago MD   Hosp Day # 2 PCP No primary care provider on file. Clinically and hemodynamically doing well. On IV ann to maintain desired bp parameters  3.  Hyperlipidemia- ldl 117- on high dose statin        Plan:     Stable from cardiac perspective post op cea  Asa, no plavix per Dr Nabila Richard  IV ann to maintain desired b

## 2020-02-20 NOTE — ANESTHESIA POSTPROCEDURE EVALUATION
[address] Patient Status:  Inpatient   Age/Gender 78year old male MRN [id number]   [facility] 6NE-A Attending Eloise Graves MD   Hosp Day # 2 PCP No primary care provider on file.        Anesthesia Post-op Note

## 2020-02-20 NOTE — BRIEF OP NOTE
Pre-Operative Diagnosis: right carotid stenosis with stroke     Post-Operative Diagnosis: right carotid stenosis with stroke, worsening symptoms     Procedure Performed:   1. US perc access right femoral art line  2. Right carotid endarterectomy with keller

## 2020-02-20 NOTE — PLAN OF CARE
1230 Received post R CEA, S/P cerebral angiogram with CT of brain. Drowsy following commands left hand grasp weaker left than right. Slight facial droop .  notified of admission to CNICU updated by Dr. Rojelio Serna.  Neuro q Received on Jean-Paul- Synephrine

## 2020-02-20 NOTE — ANESTHESIA PROCEDURE NOTES
Arterial Line  Performed by: Benjamin Matias MD  Authorized by:  Benjamin Matias MD     General Information and Staff    Procedure Start:  2/20/2020 7:00 AM  Procedure End:  2/20/2020 7:05 AM  Anesthesiologist:  Christian Corona MD  Performed By:  Liv Shafer

## 2020-02-21 ENCOUNTER — APPOINTMENT (OUTPATIENT)
Dept: CT IMAGING | Facility: HOSPITAL | Age: 70
DRG: 037 | End: 2020-02-21
Attending: INTERNAL MEDICINE
Payer: MEDICARE

## 2020-02-21 LAB
ANION GAP SERPL CALC-SCNC: 5 MMOL/L (ref 0–18)
ATRIAL RATE: 56 BPM
BUN BLD-MCNC: 8 MG/DL (ref 7–18)
BUN/CREAT SERPL: 10.8 (ref 10–20)
CALCIUM BLD-MCNC: 7.8 MG/DL (ref 8.5–10.1)
CHLORIDE SERPL-SCNC: 112 MMOL/L (ref 98–112)
CO2 SERPL-SCNC: 27 MMOL/L (ref 21–32)
CREAT BLD-MCNC: 0.74 MG/DL (ref 0.7–1.3)
DEPRECATED RDW RBC AUTO: 41.2 FL (ref 35.1–46.3)
ERYTHROCYTE [DISTWIDTH] IN BLOOD BY AUTOMATED COUNT: 12 % (ref 11–15)
GLUCOSE BLD-MCNC: 118 MG/DL (ref 70–99)
GLUCOSE BLD-MCNC: 125 MG/DL (ref 70–99)
GLUCOSE BLD-MCNC: 92 MG/DL (ref 70–99)
GLUCOSE BLD-MCNC: 97 MG/DL (ref 70–99)
HAV IGM SER QL: 2 MG/DL (ref 1.6–2.6)
HCT VFR BLD AUTO: 38.1 % (ref 39–53)
HGB BLD-MCNC: 12.7 G/DL (ref 13–17.5)
MCH RBC QN AUTO: 30.9 PG (ref 26–34)
MCHC RBC AUTO-ENTMCNC: 33.3 G/DL (ref 31–37)
MCV RBC AUTO: 92.7 FL (ref 80–100)
OSMOLALITY SERPL CALC.SUM OF ELEC: 297 MOSM/KG (ref 275–295)
P AXIS: 34 DEGREES
P-R INTERVAL: 146 MS
PHOSPHATE SERPL-MCNC: 4.1 MG/DL (ref 2.5–4.9)
PLATELET # BLD AUTO: 238 10(3)UL (ref 150–450)
POTASSIUM SERPL-SCNC: 4.1 MMOL/L (ref 3.5–5.1)
Q-T INTERVAL: 466 MS
QRS DURATION: 98 MS
QTC CALCULATION (BEZET): 449 MS
R AXIS: 45 DEGREES
RBC # BLD AUTO: 4.11 X10(6)UL (ref 3.8–5.8)
SODIUM SERPL-SCNC: 144 MMOL/L (ref 136–145)
T AXIS: 31 DEGREES
VENTRICULAR RATE: 56 BPM
WBC # BLD AUTO: 15.9 X10(3) UL (ref 4–11)

## 2020-02-21 PROCEDURE — 99291 CRITICAL CARE FIRST HOUR: CPT | Performed by: INTERNAL MEDICINE

## 2020-02-21 PROCEDURE — 70450 CT HEAD/BRAIN W/O DYE: CPT | Performed by: INTERNAL MEDICINE

## 2020-02-21 PROCEDURE — 99232 SBSQ HOSP IP/OBS MODERATE 35: CPT | Performed by: STUDENT IN AN ORGANIZED HEALTH CARE EDUCATION/TRAINING PROGRAM

## 2020-02-21 PROCEDURE — 99232 SBSQ HOSP IP/OBS MODERATE 35: CPT | Performed by: INTERNAL MEDICINE

## 2020-02-21 NOTE — OPERATIVE REPORT
[address]    PATIENT'S NAME: Teresita Pace   ATTENDING PHYSICIAN: Cameron Armstrong. Al Mcclellan MD   OPERATING PHYSICIAN: Anish Fields M.D.    PATIENT ACCOUNT#:   [de-identified]    LOCATION:  [address] [place] [address]  MEDICAL RECORD #:   [id number]       DATE OF taken for a CT scan. 5.   The patient, upon arrival to the ICU, speaking but slow, moving both lower extremities to command with appropriate strength. Right arm had normal strength. Left arm had weakness and decreased  strength compared to preop. common carotid artery with a vessel loop. With the patient's pressure appropriate in the 468 to 645 systolic, we then placed temporary clamps on the artery.   An arteriotomy was performed, identifying a ruptured plaque with a large amount of clot in it, an incision with interrupted Vicryl and Monocryl. The patient was extubated. His neuro exam was not normal after extubation. He appeared to have worsening symptoms of his stroke. The left arm was not moving at all.   He was wiggling the toes of both fe to improve. He was now moving his left arm much better but he still had weakness and decreased  strength, but he was now moving the left arm and he had  strength. His right arm was normal and appropriate. Both legs were normal and appropriate.   H

## 2020-02-21 NOTE — PHYSICAL THERAPY NOTE
PHYSICAL THERAPY EVALUATION - INPATIENT     Room Number: 2905/3665-W  Evaluation Date: 2/19/2020  Type of Evaluation: Re-evaluation  Physician Order: PT Eval and Treat    Presenting Problem: R MCA stroke, s/p R CEA 2/20/20     Reason for Therapy: Hazel Minus that he is anxious to get out of bed and get moving.       OBJECTIVE  Precautions: (SBP goal 100-150 )  Fall Risk: High fall risk    WEIGHT BEARING RESTRICTION  Weight Bearing Restriction: None                PAIN ASSESSMENT  Ratin          COGNITION  · STATUS  Gait Assessment   Gait Assistance: Contact guard assist  Distance (ft): 300  Assistive Device: None  Pattern: (inattention to left side during walking)  Stoop/Curb Assistance: Not tested       Skilled Therapy Provided: Pt approached for therapy susan continue walking.  (0)  Severe Impairment: Cannot change speeds, or loses balance and has to reach for wall or be caught.     3. Gait with horizontal head turns: 2  (3)  Normal: Performs head turns smoothly with no change in gait  (2)  Mild Impairment: Pref manifest themselves as functional limitations in independent bed mobility, transfers, and gait.   The patient is below baseline and would benefit from skilled inpatient PT to address the above deficits to assist patient in returning to prior to level of fun

## 2020-02-21 NOTE — PLAN OF CARE
Problem: NEUROLOGICAL - ADULT  Goal: Absence of seizures  Description  INTERVENTIONS  - Monitor for seizure activity  - Administer anti-seizure medications as ordered  - Monitor neurological status  Outcome: Completed     Problem: NEUROLOGICAL - ADULT  G

## 2020-02-21 NOTE — OCCUPATIONAL THERAPY NOTE
OCCUPATIONAL THERAPY EVALUATION - INPATIENT     Room Number: 1339/6266-P   Evaluation Date:2/21/2020   Type of Evaluation: Initial  Presenting Problem: R MCA, R ICA stenosis, R CEA scheduled on 2/20    Physician Order: IP Consult to Occupational Therapy  R right frontal lobe region is better appreciated on recent MRI. There are other areas of infarct which are not as well appreciated on this exam.    Continued follow-up is suggested.      There is a short segment of high-grade stenosis involving the right in in [facility]. Pt and wife were planning to drive back from [facility] to [place]. Independent with ADL, IADL. Works part-time, desk work. SUBJECTIVE   Pt stated, \"That was a moderate challenge. \"         OBJECTIVE  Precautions: (SBP goal 100-150 )  Fa Findings: Coordination - Rapid Alternating Movement; Coordination - Finger Opposition  Coordination - Finger to Nose: Asymmetrical  Coordination - Rapid Alternating Movement: Asymmetrical  Coordination - Finger Opposition: Asymmetrical       ACTIVITY TOLERA confusion, now s./p CEA and found to have CVA. Pt seen for Occupational Therapy re-eval to assess ability to participate in both self-care and func'l mobility.    Deficits are noted in the following areas: L UE strength, L UE coordination, L UE motor plann coordination activities; Compensatory technique education;Continued evaluation  Rehab Potential : Good  Frequency (Obs): Daily  Number of Visits to Meet Established Goals: 6    ADL Goals   Patient will perform grooming: with modified independent and while s

## 2020-02-21 NOTE — PROGRESS NOTES
[facility]  Cardiology Critical Care Progress Note    [facility] Patient Status:  Inpatient    1950 MRN [id number]   [facility] 6NE-A Attending Gary Gonzalez MD   Hosp Day # 3 PCP No primary care provider on file. 02/21/20 0400   • phenylephrine 25 mcg/min (02/21/20 0800)   • NiCARdipine         Assessment:      1. Acute R MCA cva   2. Right carotid disease s/p cea with vein patch- pod 1- post surgical ct with petechial hemorrhage at site of cva.  Clinically and hemo

## 2020-02-21 NOTE — PROGRESS NOTES
[facility]  Progress Note     Any Chau Patient Status:  Inpatient    1950 MRN [id number]   [facility] 7NE-A Attending Corbin Griffith MD   Hosp Day # 2 PCP No primary care provider on file.      Chief Complaint: Acute no  · Central line: no    Will the patient be referred to [facility] on discharge?: no  Estimated date of discharge: tbd  Discharge is dependent on: clnical   At this point Mr. Liang Perkins is expected to be discharge to: home?     Plan of care discussed with pt, p

## 2020-02-21 NOTE — PROGRESS NOTES
[facility]  Neurocritical Care Progress Note     Troy Arteaga Patient Status:  Inpatient    1950 MRN [id number]   [facility] 6NE-A Attending Troy Ortez MD   Hosp Day # 3 PCP No primary care provider on f lozenge, 1 lozenge, Oral, PRN  ibuprofen (MOTRIN) tab 400 mg, 400 mg, Oral, Q6H PRN    Or  ibuprofen (MOTRIN) tab 600 mg, 600 mg, Oral, Q6H PRN  finasteride (PROSCAR) tab 5 mg, 5 mg, Oral, Daily  Metoclopramide HCl (REGLAN) injection 10 mg, 10 mg, Intraven initially in progress. Dictated by: Renzo Johnson MD on 2/20/2020 at 12:02     Approved by: Renzo Johnson MD on 2/20/2020 at 12:04          Xr Fluoroscopy C-arm Time <1 Hour  (cpt=76000)    Result Date: 2/20/2020  CONCLUSION:  See above.     Dictated by: Gustavo Rasmussen,

## 2020-02-21 NOTE — PROGRESS NOTES
Still requiring vasopressor for blood pressure  Neuro exam is at preop baseline  faceasymmetry unchanged from preop  Left  strength just slightly decreased     Continue ICU management   Continue soha - likely remove tomorrow

## 2020-02-21 NOTE — SLP NOTE
SPEECH/LANGUAGE/COGNITIVE EVALUATION - INPATIENT    Admission Date: 2/17/2020  Evaluation Date: 02/21/20    Reason for Referral: Stroke protocol    ASSESSMENT & PLAN   ASSESSMENT & IMPRESSION  Patient seen at room-side for cognitive communication evaluatio New   Goal #2 Patient will complete simple 3-5 step sequencing task (verbal and/or visual) 90% acc, min cues only New   Goal #3 Patient will recall 3-4 part message with use of learned strategies for retention/memory 100% acc New   Goal #4 Additional goals

## 2020-02-21 NOTE — PLAN OF CARE
Assumed care of Hilary Cortez @ approximately 3851: awake, alert, oriented, pleasant, and cooperative with care; [facility] neuro checks: occasionally slow to answer questions, LUE and left hand  weaker than right but remains strong when encouraged, BLE equal; left fi status  Description  INTERVENTIONS  - Assess for and report changes in neurological status  - Initiate measures to prevent increased intracranial pressure  - Maintain blood pressure and fluid volume within ordered parameters to optimize cerebral perfusion Functional Mobility  Goal: Achieve highest/safest level of mobility/gait  Description  Interventions:  - Assess patient's functional ability and stability  - Promote increasing activity/tolerance for mobility and gait  - Educate and engage patient/family i

## 2020-02-21 NOTE — PROGRESS NOTES
[facility]  Progress Note     Sharyle Moras Patient Status:  Inpatient    1950 MRN [id number]   [facility] 7NE-A Attending Lashay Arrington MD   Hosp Day # 3 PCP No primary care provider on file.      Chief Complaint: Acute mg/dL). Recent Labs   Lab 02/17/20  2012 02/20/20  1248   PTP 13.1 14.4   INR 0.95 1.07       Recent Labs   Lab 02/17/20  2012   TROP <0.045            Imaging: Imaging data reviewed in Epic.     Medications:   • finasteride  5 mg Oral Daily   • docusate

## 2020-02-22 ENCOUNTER — APPOINTMENT (OUTPATIENT)
Dept: CT IMAGING | Facility: HOSPITAL | Age: 70
DRG: 037 | End: 2020-02-22
Attending: Other
Payer: MEDICARE

## 2020-02-22 LAB
ANION GAP SERPL CALC-SCNC: 5 MMOL/L (ref 0–18)
BUN BLD-MCNC: 14 MG/DL (ref 7–18)
BUN/CREAT SERPL: 15.9 (ref 10–20)
CALCIUM BLD-MCNC: 8.3 MG/DL (ref 8.5–10.1)
CHLORIDE SERPL-SCNC: 110 MMOL/L (ref 98–112)
CO2 SERPL-SCNC: 27 MMOL/L (ref 21–32)
CREAT BLD-MCNC: 0.88 MG/DL (ref 0.7–1.3)
DEPRECATED RDW RBC AUTO: 42.5 FL (ref 35.1–46.3)
ERYTHROCYTE [DISTWIDTH] IN BLOOD BY AUTOMATED COUNT: 12.4 % (ref 11–15)
GLUCOSE BLD-MCNC: 105 MG/DL (ref 70–99)
GLUCOSE BLD-MCNC: 107 MG/DL (ref 70–99)
GLUCOSE BLD-MCNC: 119 MG/DL (ref 70–99)
GLUCOSE BLD-MCNC: 98 MG/DL (ref 70–99)
HCT VFR BLD AUTO: 37.2 % (ref 39–53)
HGB BLD-MCNC: 12.3 G/DL (ref 13–17.5)
MCH RBC QN AUTO: 31.1 PG (ref 26–34)
MCHC RBC AUTO-ENTMCNC: 33.1 G/DL (ref 31–37)
MCV RBC AUTO: 93.9 FL (ref 80–100)
OSMOLALITY SERPL CALC.SUM OF ELEC: 296 MOSM/KG (ref 275–295)
PLATELET # BLD AUTO: 159 10(3)UL (ref 150–450)
POTASSIUM SERPL-SCNC: 3.8 MMOL/L (ref 3.5–5.1)
RBC # BLD AUTO: 3.96 X10(6)UL (ref 3.8–5.8)
SODIUM SERPL-SCNC: 142 MMOL/L (ref 136–145)
WBC # BLD AUTO: 13.7 X10(3) UL (ref 4–11)

## 2020-02-22 PROCEDURE — 70450 CT HEAD/BRAIN W/O DYE: CPT | Performed by: OTHER

## 2020-02-22 PROCEDURE — 99291 CRITICAL CARE FIRST HOUR: CPT | Performed by: INTERNAL MEDICINE

## 2020-02-22 PROCEDURE — 99232 SBSQ HOSP IP/OBS MODERATE 35: CPT | Performed by: INTERNAL MEDICINE

## 2020-02-22 PROCEDURE — 99232 SBSQ HOSP IP/OBS MODERATE 35: CPT | Performed by: STUDENT IN AN ORGANIZED HEALTH CARE EDUCATION/TRAINING PROGRAM

## 2020-02-22 RX ORDER — HEPARIN SODIUM 5000 [USP'U]/ML
5000 INJECTION, SOLUTION INTRAVENOUS; SUBCUTANEOUS EVERY 12 HOURS SCHEDULED
Status: DISCONTINUED | OUTPATIENT
Start: 2020-02-22 | End: 2020-02-25

## 2020-02-22 RX ORDER — POTASSIUM CHLORIDE 20 MEQ/1
40 TABLET, EXTENDED RELEASE ORAL ONCE
Status: DISCONTINUED | OUTPATIENT
Start: 2020-02-22 | End: 2020-02-25

## 2020-02-22 RX ORDER — SODIUM CHLORIDE 9 MG/ML
INJECTION, SOLUTION INTRAVENOUS CONTINUOUS
Status: DISCONTINUED | OUTPATIENT
Start: 2020-02-22 | End: 2020-02-25

## 2020-02-22 NOTE — PROGRESS NOTES
[facility]  Progress Note     Troy Arteaga Patient Status:  Inpatient    1950 MRN [id number]   [facility] 7NE-A Attending Troy Ortez MD   Hosp Day # 4 PCP No primary care provider on file.      Chief Complaint: Acute --   --   --    TP 7.4  --   --   --   --     < > = values in this interval not displayed. Estimated Creatinine Clearance: 77.7 mL/min (based on SCr of 0.88 mg/dL).     Recent Labs   Lab 02/17/20 2012 02/20/20  1248   PTP 13.1 14.4   INR 0.95 1.07

## 2020-02-22 NOTE — PROGRESS NOTES
Report called to Samira Elise RN for pt to transfer to [address]    Pt being transported via w/c

## 2020-02-22 NOTE — PROGRESS NOTES
[facility]  Cardiology Critical Care Progress Note    [facility] Patient Status:  Inpatient    1950 MRN [id number]   [facility] 6NE-A Attending Troy Ortez MD   Hosp Day # 4 PCP No primary care provider on file. care today  On full dose ASA and high dose statin  Increase activity  15 min CCU time this AM

## 2020-02-22 NOTE — PHYSICAL THERAPY NOTE
IP PT attempted x 2. Pt occupied c breakfast, family member present and assisting. Returned to room, pt in BR, PCT present. Will re-attempt as schedule permits.

## 2020-02-22 NOTE — PROGRESS NOTES
[facility]  Neurocritical Care Progress Note     Sandra Casey Patient Status:  Inpatient    1950 MRN [id number]   [facility] 6NE-A Attending Lola Garcia MD   Hosp Day # 4 PCP No primary care provider on f Or  morphINE sulfate (PF) 4 MG/ML injection 8 mg, 8 mg, Intravenous, Q1H PRN  ibuprofen (MOTRIN) tab 400 mg, 400 mg, Oral, Q6H PRN    Or  ibuprofen (MOTRIN) tab 600 mg, 600 mg, Oral, Q6H PRN  finasteride (PROSCAR) tab 5 mg, 5 mg, Oral, Daily  Metoclopramid hemorrhagic conversion stable on rpt HCT, cont neurochecks/pt/ot, cont ASA  Cardiac:  SBP goal 100-150 given recent ischemic infarct with trace hemorrhagic conversion.  Cont statin   Pulmonary:  Stable on RA  Renal:  IVFs  GI:  PO as tolerated  Heme/ID:  Af

## 2020-02-22 NOTE — PROGRESS NOTES
Vascular Surgery Progress Note    No acute events. Off vasopressors. Arterial line removed. Patient denies any pain. Been up with PT already today. Tolerating a diet    AOx3, slight left facial asymmetry. Strength 5/5 and equal bilaterally.  Right neck inci

## 2020-02-22 NOTE — PLAN OF CARE
Patient is alert and oriented x4. Left facial droop and left sided weakness noted,with left side neglect. Assisted to the bathroom with two person assist, his gait is normal. Patient denies blurriness of his vision, vision is normal on both sides.  Right ne

## 2020-02-22 NOTE — PLAN OF CARE
Patient transferred to CTU 7 @ 1130  Patient alert and oriented x4   Slight L facial droop  Family says he is \"off\" at times  Systolic bp < 784. 093 mL bolus given  IV fluids started  Tingling in LUE off and on.      Plan of care discussed with patient an

## 2020-02-23 LAB
GLUCOSE BLD-MCNC: 100 MG/DL (ref 70–99)
GLUCOSE BLD-MCNC: 104 MG/DL (ref 70–99)
GLUCOSE BLD-MCNC: 107 MG/DL (ref 70–99)
GLUCOSE BLD-MCNC: 121 MG/DL (ref 70–99)
GLUCOSE BLD-MCNC: 99 MG/DL (ref 70–99)
POTASSIUM SERPL-SCNC: 3.7 MMOL/L (ref 3.5–5.1)

## 2020-02-23 PROCEDURE — 99232 SBSQ HOSP IP/OBS MODERATE 35: CPT | Performed by: INTERNAL MEDICINE

## 2020-02-23 PROCEDURE — 99233 SBSQ HOSP IP/OBS HIGH 50: CPT | Performed by: OTHER

## 2020-02-23 PROCEDURE — 99232 SBSQ HOSP IP/OBS MODERATE 35: CPT | Performed by: HOSPITALIST

## 2020-02-23 RX ORDER — POTASSIUM CHLORIDE 20 MEQ/1
40 TABLET, EXTENDED RELEASE ORAL ONCE
Status: COMPLETED | OUTPATIENT
Start: 2020-02-23 | End: 2020-02-23

## 2020-02-23 NOTE — PLAN OF CARE
Assumed care at [address] [place] [address] at bedside  Alert and oriented x4  Neuros Q4, slight L facial droop noted  C/o intermittent numbness/tingling to L arm  Dr. Urban Sanders notified, ordered stat CT head  Results called in, no new orders at this time  -150  IV ADULT  Goal: Achieves stable or improved neurological status  Description  INTERVENTIONS  - Assess for and report changes in neurological status  - Initiate measures to prevent increased intracranial pressure  - Maintain blood pressure and fluid volume wit Progressing     Problem: Impaired Cognition  Goal: Patient will exhibit improved attention, thought processing and/or memory  Description  Interventions:  - Minimize distractions in the room when full attention is required  - Consider use of a daily journa

## 2020-02-23 NOTE — PROGRESS NOTES
Vascular Surgery Progress Note    No acute events. Off vasopressors. Slight hypotension. Getting fluid bolus now     AOx3, slight left facial asymmetry. Strength 5/5 and equal bilaterally.  Right neck incision c/d/i with steristrips in place    POD #3 s/p r

## 2020-02-23 NOTE — PLAN OF CARE
Assumed care at 0730  Patient alert and oriented x4  No acute neurological changes  Denies numbness/tingling in LUE  bp systolic < 372. Dr. Natalya Morales notified. 250 mL bolus given x2.  IV fluids increased  Given gatorade per neuros recs  Tylenol given for mild

## 2020-02-23 NOTE — PROGRESS NOTES
[facility]  Progress Note    Eleni Valentin Patient Status:  Inpatient    1950 MRN [id number]   [facility] 7NE-A Attending Marilou Guzman MD   Hosp Day # 5 PCP No primary care provider on file.        Assessment and Plan:  Pa no wheezing  Abdomen: Soft, non-tender. Musculoskeletal: normal range of motion  Extremities: no edema  Neurologic: Normal affect, alert and oriented x 3  Skin: Warm and dry.      Laboratory/Data:    Labs:  Lab Results   Component Value Date    K 3.7 02/2 Enema (FLEET) 7-19 GM/118ML enema 133 mL, 1 enema, Rectal, Once PRN  acetaminophen (TYLENOL) tab 650 mg, 650 mg, Oral, Q4H PRN    Or  acetaminophen (TYLENOL) 650 MG rectal suppository 650 mg, 650 mg, Rectal, Q4H PRN  Labetalol HCl (TRANDATE) injection 10 m

## 2020-02-23 NOTE — PROGRESS NOTES
[address]  Neurology Progress Note    Hildred Adjutant [facility] Patient Status:  Inpatient    1950 MRN [id number]   [facility] 7NE-A Attending Elizabeth Friedman MD   Hosp Day # 5 PCP No primary care provider on file.      [facility] conversion of ischemic infarct. He was monitored in CNICU, and now transferred to floor. Yesterday evening, had increased N/T to LUE and stat CTH was ordered which was negative for acute processes. He currently is sitting up in chair.   BP has been low t is a short segment of high-grade stenosis involving the right internal carotid artery shortly after its origin over a segment measuring approximately 1.2 cm.   This is difficult to measure given the high-grade stenosis as the lumen appears string   like in Neurology if pt becomes symptomatic  Hold finasteride at this time    Pt seen with Dr. Samira Oswald, [address]  2/23/2020, 8:47 AM  Spectre [id number]

## 2020-02-23 NOTE — PROGRESS NOTES
[facility]  Progress Note     Fadumo Prentiss Patient Status:  Inpatient    1950 MRN [id number]   [facility] 7NE-A Attending Gary Gonzalez MD   Hosp Day # 5 PCP No primary care provider on file.      Chief Complaint: Acute ALT 21  --   --   --   --   --    BILT 0.6  --   --   --   --   --    TP 7.4  --   --   --   --   --     < > = values in this interval not displayed. Estimated Creatinine Clearance: 77.7 mL/min (based on SCr of 0.88 mg/dL).     Recent Labs   Lab 02/

## 2020-02-24 LAB
GLUCOSE BLD-MCNC: 100 MG/DL (ref 70–99)
GLUCOSE BLD-MCNC: 116 MG/DL (ref 70–99)
GLUCOSE BLD-MCNC: 94 MG/DL (ref 70–99)
POTASSIUM SERPL-SCNC: 4.1 MMOL/L (ref 3.5–5.1)
TSI SER-ACNC: 2.85 MIU/ML (ref 0.36–3.74)

## 2020-02-24 PROCEDURE — 99233 SBSQ HOSP IP/OBS HIGH 50: CPT | Performed by: STUDENT IN AN ORGANIZED HEALTH CARE EDUCATION/TRAINING PROGRAM

## 2020-02-24 PROCEDURE — 99232 SBSQ HOSP IP/OBS MODERATE 35: CPT | Performed by: NURSE PRACTITIONER

## 2020-02-24 NOTE — PROGRESS NOTES
02/24/20 1638 02/24/20 1640 02/24/20 1642   Vital Signs   Pulse 54 59 57   Heart Rate Source Monitor Monitor Monitor   Resp 18 24 24   Respiratory Quality Normal Normal Normal   /60 119/40 93/46   BP Location Right arm Right arm Right arm   BP Met

## 2020-02-24 NOTE — PLAN OF CARE
Assumed care at 0700. Pt A/O x4. RA. SB on tele. VSS. Denies any pain. Neuro checks q4. Pt still having intermittent tingling to his L arm, neuro aware. R CEA site with steri strips has old drainage present. Site painted with betadine. Pt orthostatic +.  Wh lead EKG if indicated  - Evaluate effectiveness of antiarrhythmic and heart rate control medications as ordered  - Initiate emergency measures for life threatening arrhythmias  - Monitor electrolytes and administer replacement therapy as ordered  Outcome: functional activity level and precautions during self-care  - Educate patient about visual perception exercises.     -Encourage pt to turn to the L to locate items d/t impaired attention.  -Encourage pt to utilize L UE as much as possible during the day to

## 2020-02-24 NOTE — PLAN OF CARE
Assumed care 2000. Pt A&Ox4. SB on tele. Neuros q4 maintained. SBP within parameters for shift. Denies pain. Pt with 5 second burst of PAT and 2 beats of junctional escapes. Pt asymptomatic. MD notified. S/p R CEA. Closed with steri stripes.  Jeffrey

## 2020-02-24 NOTE — OCCUPATIONAL THERAPY NOTE
OCCUPATIONAL THERAPY TREATMENT NOTE - INPATIENT     Room Number: 7397/6230-R  Session: 1   Number of Visits to Meet Established Goals: 6    Presenting Problem: R MCA, R ICA stenosis, R CEA scheduled on 2/20    History related to current admission:  Pt was not as well appreciated on this exam.    Continued follow-up is suggested. There is a short segment of high-grade stenosis involving the right internal carotid artery shortly after its origin over a segment measuring approximately 1.2 cm.   This is diff currently need…  -   Putting on and taking off regular lower body clothing?: A Little  -   Bathing (including washing, rinsing, drying)?: A Little  -   Toileting, which includes using toilet, bedpan or urinal? : A Little  -   Putting on and taking off regu OT Discharge Recommendations: Day rehab  OT Device Recommendations: None    PLAN  OT Treatment Plan: Balance activities; Energy conservation/work simplification techniques;ADL training;IADL training;Visual perceptual training;Functional transfer trainin

## 2020-02-24 NOTE — PROGRESS NOTES
[facility]  Progress Note     Nicole Hopkins Patient Status:  Inpatient    1950 MRN [id number]   [facility] 7NE-A Attending Russell Alfred MD   Hosp Day # 6 PCP No primary care provider on file.      Chief Complaint: Acute 27.0  --   --    ALKPHO 77  --   --   --   --   --   --    AST 16  --   --   --   --   --   --    ALT 21  --   --   --   --   --   --    BILT 0.6  --   --   --   --   --   --    TP 7.4  --   --   --   --   --   --     < > = values in this interval not disp

## 2020-02-24 NOTE — PROGRESS NOTES
[id number] [address] Neurology Progress Note    Amaral [facility] Patient Status:  Inpatient    1950 MRN [id number]   [facility] 7NE-A Attending Anel Holland MD   Hosp Day # 6 PCP No primary care provider on file.          Subjecti right centrum semiovale similar to appearance with the   diffusion-weighted images from 2/17/2020.  Contralateral hemisphere is intact.  No mass effect or hydrocephalus.     CTA H/N 2/17/20:  CONCLUSION:  There is no evidence of acute intracranial hemorrha mEq Oral Once   • Heparin Sodium (Porcine)  5,000 Units Subcutaneous 2 times per day   • finasteride  5 mg Oral Daily   • docusate sodium  100 mg Oral BID   • atorvastatin  80 mg Oral Nightly   • aspirin  300 mg Rectal Daily    Or   • aspirin  325 mg Oral

## 2020-02-24 NOTE — PROGRESS NOTES
[facility]  Cardiology Progress Note    Cristin [facility] Patient Status:  Inpatient    1950 MRN [id number]   [facility] 7NE-A Attending Anel Holland MD   Hosp Day # 6 PCP No primary care provider on file.      Subjective:  No

## 2020-02-24 NOTE — PHYSICAL THERAPY NOTE
Attempted to see pt for PT treatment. SBP below 100- 140 parameters set by neurology. Most recently 80/44. Will hold PT treatment at this time. RN aware.

## 2020-02-24 NOTE — CM/SW NOTE
Care Progression Note:  Active Acute Medical Issue:   Multiple infarcts, R MCA - hypotension/orthostatic  S/p R CEA 2/20. Other Contributing Medical Factors/Dx. :     Length of stay: 6  Avoidable Delays: none  Discharge Barriers: none  Expected discharge

## 2020-02-25 VITALS
RESPIRATION RATE: 14 BRPM | WEIGHT: 188.69 LBS | DIASTOLIC BLOOD PRESSURE: 58 MMHG | TEMPERATURE: 98 F | BODY MASS INDEX: 28.6 KG/M2 | HEIGHT: 68 IN | SYSTOLIC BLOOD PRESSURE: 112 MMHG | HEART RATE: 50 BPM | OXYGEN SATURATION: 94 %

## 2020-02-25 LAB
BASOPHILS # BLD AUTO: 0.02 X10(3) UL (ref 0–0.2)
BASOPHILS NFR BLD AUTO: 0.2 %
DEPRECATED RDW RBC AUTO: 42.2 FL (ref 35.1–46.3)
EOSINOPHIL # BLD AUTO: 0.21 X10(3) UL (ref 0–0.7)
EOSINOPHIL NFR BLD AUTO: 2.6 %
ERYTHROCYTE [DISTWIDTH] IN BLOOD BY AUTOMATED COUNT: 12.3 % (ref 11–15)
GLUCOSE BLD-MCNC: 100 MG/DL (ref 70–99)
GLUCOSE BLD-MCNC: 89 MG/DL (ref 70–99)
HCT VFR BLD AUTO: 38.7 % (ref 39–53)
HGB BLD-MCNC: 12.6 G/DL (ref 13–17.5)
IMM GRANULOCYTES # BLD AUTO: 0.04 X10(3) UL (ref 0–1)
IMM GRANULOCYTES NFR BLD: 0.5 %
LYMPHOCYTES # BLD AUTO: 1.43 X10(3) UL (ref 1–4)
LYMPHOCYTES NFR BLD AUTO: 17.5 %
MCH RBC QN AUTO: 30.4 PG (ref 26–34)
MCHC RBC AUTO-ENTMCNC: 32.6 G/DL (ref 31–37)
MCV RBC AUTO: 93.3 FL (ref 80–100)
MONOCYTES # BLD AUTO: 1.01 X10(3) UL (ref 0.1–1)
MONOCYTES NFR BLD AUTO: 12.4 %
NEUTROPHILS # BLD AUTO: 5.46 X10 (3) UL (ref 1.5–7.7)
NEUTROPHILS # BLD AUTO: 5.46 X10(3) UL (ref 1.5–7.7)
NEUTROPHILS NFR BLD AUTO: 66.8 %
PLATELET # BLD AUTO: 190 10(3)UL (ref 150–450)
RBC # BLD AUTO: 4.15 X10(6)UL (ref 3.8–5.8)
WBC # BLD AUTO: 8.2 X10(3) UL (ref 4–11)

## 2020-02-25 PROCEDURE — 99232 SBSQ HOSP IP/OBS MODERATE 35: CPT | Performed by: INTERNAL MEDICINE

## 2020-02-25 PROCEDURE — 99233 SBSQ HOSP IP/OBS HIGH 50: CPT | Performed by: OTHER

## 2020-02-25 PROCEDURE — 99239 HOSP IP/OBS DSCHRG MGMT >30: CPT | Performed by: HOSPITALIST

## 2020-02-25 PROCEDURE — 99232 SBSQ HOSP IP/OBS MODERATE 35: CPT | Performed by: NURSE PRACTITIONER

## 2020-02-25 RX ORDER — MIDODRINE HYDROCHLORIDE 2.5 MG/1
2.5 TABLET ORAL 3 TIMES DAILY
Status: DISCONTINUED | OUTPATIENT
Start: 2020-02-25 | End: 2020-02-25

## 2020-02-25 RX ORDER — ASPIRIN 325 MG
325 TABLET ORAL DAILY
Qty: 30 TABLET | Refills: 2 | Status: SHIPPED | OUTPATIENT
Start: 2020-02-26

## 2020-02-25 RX ORDER — ATORVASTATIN CALCIUM 80 MG/1
80 TABLET, FILM COATED ORAL NIGHTLY
Qty: 30 TABLET | Refills: 2 | Status: SHIPPED | OUTPATIENT
Start: 2020-02-25

## 2020-02-25 RX ORDER — MIDODRINE HYDROCHLORIDE 2.5 MG/1
2.5 TABLET ORAL 3 TIMES DAILY
Qty: 90 TABLET | Refills: 0 | Status: SHIPPED | OUTPATIENT
Start: 2020-02-25

## 2020-02-25 NOTE — PROGRESS NOTES
02/25/20 1044 02/25/20 1045 02/25/20 1047   Vital Signs   Pulse 59 53 54   /52 102/49 95/41   BP Location Right arm Right arm Right arm   BP Method Automatic Automatic Automatic   Patient Position Lying Sitting Standing

## 2020-02-25 NOTE — SLP NOTE
SPEECH DAILY NOTE - INPATIENT    ASSESSMENT & PLAN   ASSESSMENT  Patient seen for ongoing cognitive communicative tx 2/2 acute CVA. Pt awake, alert, and cooperative. Spouse reported Pt \"seemed to be much better. \"  Patient motivated and participatory in a

## 2020-02-25 NOTE — PLAN OF CARE
Assumed care at 0700. Pt A/O x4. RA. SB on tele VSS. Neuro checks q4, neurologically stable. Denies any L arm numbness or tingling. SBP above 100 throughout the night. Orthostatics done again, SBP 90s when standing. Started on Midodrine.  After midodrine Or electrolytes and administer replacement therapy as ordered  Outcome: Progressing     Problem: NEUROLOGICAL - ADULT  Goal: Achieves stable or improved neurological status  Description  INTERVENTIONS  - Assess for and report changes in neurological status  - attention.  -Encourage pt to utilize L UE as much as possible during the day to promote functioning.       Outcome: Progressing     Problem: Impaired Cognition  Goal: Patient will exhibit improved attention, thought processing and/or memory  Description  In

## 2020-02-25 NOTE — PLAN OF CARE
Assumed care 1900. Pt A&Ox4. VSS. SBP goal > 100 obtained. Neuros q4 maintained. No acute changes over night. Pt with some right sided rib discomfort. Repositioning & Tylenol given with relief. Voiding. Resting in bed.    Pt and wife updated on POC

## 2020-02-25 NOTE — PROGRESS NOTES
02/25/20 1044 02/25/20 1045 02/25/20 1046   Vital Signs   Pulse 59 53 59   /52 102/49  --       02/25/20 1047   Vital Signs   Pulse 54   BP 95/41

## 2020-02-25 NOTE — DISCHARGE SUMMARY
[facility]  DISCHARGE SUMMARY     Sharyle Moras Patient Status:  Inpatient    1950 MRN [id number]   [facility] 7NE-A Attending Army Moreno MD   Hosp Day # 7 PCP No primary care provider on file.      Date of Admission trial with Midodrine. He is doing well and plans are for discharge home today. Lace+ Score: 68  59-90 High Risk  29-58 Medium Risk  0-28   Low Risk         TCM Follow-Up Recommendation:  LACE > 58:  High Risk of readmission after discharge from the hosp [address] [facility]  [phone]    Call in 2 weeks      Appointments for Next 30 Days 2/25/2020 - 3/26/2020    None          Vital signs:  Temp:  [97.8 °F (36.6 °C)-98.8 °F (37.1 °C)] 97.8 °F (36.6 °C)  Pulse:  [46-62] 50  Resp:  [14-26] 14  BP: (93

## 2020-02-25 NOTE — PHYSICAL THERAPY NOTE
PHYSICAL THERAPY TREATMENT NOTE - INPATIENT    Room Number: 8369/2253-V     Session: 1   Number of Visits to Meet Established Goals: 5    Presenting Problem: R MCA stroke, s/p R CEA 2/20/20    Problem List  Principal Problem:    Acute ischemic right MCA s wheelchair)?: None   -   Need to walk in hospital room?: A Little   -   Climbing 3-5 steps with a railing?: A Little       AM-PAC Score:  Raw Score: 22   Approx Degree of Impairment: 20.91%   Standardized Score (AM-PAC Scale): 53.28   CMS Modifier (G-Code) Treatment Plan: Bed mobility; Patient education; Family education;Gait training;Neuromuscular re-educate;Stair training;Transfer training;Balance training  Rehab Potential : Good  Frequency (Obs): 3-5x/week    CURRENT GOALS     Goal #1 Pt will complete supin

## 2020-02-25 NOTE — PROGRESS NOTES
[facility]  Cardiology Progress Note    [facility] Patient Status:  Inpatient    1950 MRN [id number]   [facility] 7NE-A Attending Kaycee Melendrez MD   Hosp Day # 7 PCP No primary care provider on file.      Subjective:  He

## 2020-02-25 NOTE — PROGRESS NOTES
[id number] [address] Neurology Progress Note    Kavin [facility] Patient Status:  Inpatient    1950 MRN [id number]   [facility] 7NE-A Attending Kristin Nogueira MD   Hosp Day # 7 PCP No primary care provider on file.      NEUROLOGY 26.0 - 34.0 pg    MCHC 32.6 31.0 - 37.0 g/dL    RDW 12.3 11.0 - 15.0 %    RDW-SD 42.2 35.1 - 46.3 fL    Neutrophil Absolute Prelim 5.46 1.50 - 7.70 x10 (3) uL    Neutrophil Absolute 5.46 1.50 - 7.70 x10(3) uL    Lymphocyte Absolute 1.43 1.00 - 4.00 x10(3) notes        Subjective:  Amber Live is a(n) 78year old male with PMH BPH who presented to ED on 2/17 due to confusion/lethargy, found to have multiple infarcts in R MCA distribution.   CTA revealed high-grade R carotid stenosis, and he is now s diffusion-weighted images from 2/17/2020.  Contralateral hemisphere is intact.  No mass effect or hydrocephalus.     CTA H/N 2/17/20:  CONCLUSION:  There is no evidence of acute intracranial hemorrhage.  Known area of infarct within the right frontal lob infarcts due to right ICA stenosis s/p CEA   Hypotension, orthostatic HTN    Plan:  Ischemic order set  -140  Continue ASA 325mg daily (was on ASA 81mg PTA)  Continue atorvastatin 80mg qhs   PT/OT/ST   DVT prophylaxis, heparin  BP management per car

## 2020-03-03 ENCOUNTER — OFFICE VISIT (OUTPATIENT)
Dept: SPEECH THERAPY | Facility: HOSPITAL | Age: 70
End: 2020-03-03
Attending: HOSPITALIST
Payer: MEDICARE

## 2020-03-03 DIAGNOSIS — I63.511 ACUTE ISCHEMIC RIGHT MCA STROKE (HCC): ICD-10-CM

## 2020-03-03 PROCEDURE — 92522 EVALUATE SPEECH PRODUCTION: CPT

## 2020-03-03 NOTE — PROGRESS NOTES
ADULT SPEECH/LANGUAGE EVALUATION:   Referring Physician: Dr. Alyssa Felipe  Diagnosis: Acute ischemic right MCA stroke [facility]) (T02.626)  Date of Service: 3/3/2020     PATIENT SUMMARY   Any Chau is a 78year old male who presents to therapy today with reviewed with patient. . Significant findings include: stroke. Past Medical History:   Diagnosis Date   • BPH (benign prostatic hyperplasia)      aspirin 325 MG Oral Tab, Take 1 tablet (325 mg total) by mouth daily. , Disp: 30 tablet, Rfl: 2  atorvastatin word-finding errors and pauses. Patient displayed difficulty producing words during generative naming activity (patient named 12 animals, average=21, patient named 3 /m/ words, average = 14).      Auditory Comprehension: Patient completed CLQT's auditory co independently verbalize understanding of compensatory strategies addressing expressive language in 1-2 sessions. STG 2: Patient will independently verbalize understanding of compensatory strategies addressing cognitive communication in 1-2 sessions.     Pa

## 2020-03-05 ENCOUNTER — OFFICE VISIT (OUTPATIENT)
Dept: OCCUPATIONAL MEDICINE | Facility: HOSPITAL | Age: 70
End: 2020-03-05
Attending: SURGERY
Payer: MEDICARE

## 2020-03-05 DIAGNOSIS — I63.511 ACUTE ISCHEMIC RIGHT MCA STROKE (HCC): ICD-10-CM

## 2020-03-05 PROCEDURE — 97112 NEUROMUSCULAR REEDUCATION: CPT

## 2020-03-05 PROCEDURE — 97165 OT EVAL LOW COMPLEX 30 MIN: CPT

## 2020-03-05 NOTE — PROGRESS NOTES
OCCUPATIONAL THERAPY NEUROLOGICAL EVALUATION:.   Referring Physician: Dr. Sukhi Vasquez  Diagnosis: Acute ischemic R MCA     Date of Service: 3/5/2020     PATIENT SUMMARY   Debora Li is a 78year old male who presents to therapy today with complaints lobe region is better appreciated on recent MRI. There are other areas of infarct which are not as well appreciated on this exam.    Continued follow-up is suggested.      There is a short segment of high-grade stenosis involving the right internal carotid numbers. Mild orientation/spacing impairments noted. Pt and OT discussed evaluation findings,  POC and HEP. Pt voiced understanding and performs HEP correctly without reported pain.  Skilled Occupational Therapy is medically necessary to address the above be met in 4 sessions)  Pt will complete L UE HEPs with 1 cue for pacing. Pt will improve L hand coordination by 3 seconds for IADL completion. Pt will increase L hand  by 3# for opening containers.    Pt will complete trail making test to examine pro

## 2020-03-06 ENCOUNTER — OFFICE VISIT (OUTPATIENT)
Dept: PHYSICAL THERAPY | Facility: HOSPITAL | Age: 70
End: 2020-03-06
Attending: HOSPITALIST
Payer: MEDICARE

## 2020-03-06 DIAGNOSIS — R41.0 ACUTE CONFUSION: ICD-10-CM

## 2020-03-06 DIAGNOSIS — I63.511 ACUTE ISCHEMIC RIGHT MCA STROKE (HCC): ICD-10-CM

## 2020-03-06 PROCEDURE — 97110 THERAPEUTIC EXERCISES: CPT

## 2020-03-06 PROCEDURE — 97161 PT EVAL LOW COMPLEX 20 MIN: CPT

## 2020-03-06 NOTE — PROGRESS NOTES
NEUROLOGICAL EVALUATION:   Referring Physician: Dr. Viral Boswell  Diagnosis: Acute ischemic right MCA stroke [facility])      Date of Service: 3/6/2020     PATIENT SUMMARY   Jaclyn Becker is a 78year old male who presents to therapy today with complaints of function as independent in household and community level function without limitations of the LUE or feeling of fatigue. Pt goals include \"to get back to normalcy, to not tire as quickly, not have to take naps, and be able to to yard work unrestricted. \" Grossly intact to light touch SHAISTA UE/LE   Coordination:     Finger to Nose: NT; assessed in OT    Heel-to-Shin: WNL    Pronation/Supination: WNL    Toe Tap: WNL    Flexibility:  Hip Flexor: MIN inc mm tension SHAISTA  Hamstrings: R 67; L 66  Piriformis: MIN in and romberg balance on foam pad (progress to [facility]).      Charges: PT Eval Low Complexity, Therx x 1      Total Timed Treatment: 10 min     Total Treatment Time: 55 min     Based on clinical rationale and outcome measures, this evaluation involved Low Complexi Date____________________    Certification From: 8/0/9649  To:6/4/2020

## 2020-03-10 ENCOUNTER — OFFICE VISIT (OUTPATIENT)
Dept: SPEECH THERAPY | Facility: HOSPITAL | Age: 70
End: 2020-03-10
Attending: HOSPITALIST
Payer: MEDICARE

## 2020-03-10 DIAGNOSIS — I63.511 ACUTE ISCHEMIC RIGHT MCA STROKE (HCC): ICD-10-CM

## 2020-03-10 PROCEDURE — 92507 TX SP LANG VOICE COMM INDIV: CPT

## 2020-03-10 RX ORDER — ASPIRIN 325 MG
325 TABLET ORAL DAILY
COMMUNITY

## 2020-03-10 RX ORDER — ATORVASTATIN CALCIUM 80 MG/1
80 TABLET, FILM COATED ORAL DAILY
COMMUNITY

## 2020-03-10 RX ORDER — MIDODRINE HYDROCHLORIDE 2.5 MG/1
2.5 TABLET ORAL 3 TIMES DAILY
COMMUNITY
End: 2020-03-18

## 2020-03-10 NOTE — PROGRESS NOTES
Treatment #2  (Medicare 2/10) - (POC due 6/1/2020)              Treatment Time: 60 minutes  Precautions: None                                                               Charges: 1 billed ([id number])  Pain: 0/10

## 2020-03-11 ENCOUNTER — OFFICE VISIT (OUTPATIENT)
Dept: CARDIOLOGY | Age: 70
End: 2020-03-11

## 2020-03-11 VITALS
HEIGHT: 68 IN | WEIGHT: 186 LBS | HEART RATE: 66 BPM | BODY MASS INDEX: 28.19 KG/M2 | DIASTOLIC BLOOD PRESSURE: 62 MMHG | SYSTOLIC BLOOD PRESSURE: 120 MMHG

## 2020-03-11 DIAGNOSIS — Z98.890 HISTORY OF CEA (CAROTID ENDARTERECTOMY): ICD-10-CM

## 2020-03-11 DIAGNOSIS — I95.1 ORTHOSTATIC HYPOTENSION: ICD-10-CM

## 2020-03-11 DIAGNOSIS — I65.21 STENOSIS OF RIGHT CAROTID ARTERY: ICD-10-CM

## 2020-03-11 DIAGNOSIS — I63.9 CEREBROVASCULAR ACCIDENT (CVA), UNSPECIFIED MECHANISM (CMD): Primary | ICD-10-CM

## 2020-03-11 PROCEDURE — 99204 OFFICE O/P NEW MOD 45 MIN: CPT | Performed by: NURSE PRACTITIONER

## 2020-03-11 SDOH — HEALTH STABILITY: PHYSICAL HEALTH: ON AVERAGE, HOW MANY MINUTES DO YOU ENGAGE IN EXERCISE AT THIS LEVEL?: 0 MIN

## 2020-03-11 SDOH — HEALTH STABILITY: PHYSICAL HEALTH: ON AVERAGE, HOW MANY DAYS PER WEEK DO YOU ENGAGE IN MODERATE TO STRENUOUS EXERCISE (LIKE A BRISK WALK)?: 0 DAYS

## 2020-03-11 ASSESSMENT — ENCOUNTER SYMPTOMS
DIAPHORESIS: 0
NEUROLOGICAL NEGATIVE: 1
DECREASED APPETITE: 0
WEIGHT GAIN: 0
SHORTNESS OF BREATH: 0
SYNCOPE: 0
WEIGHT LOSS: 0
GASTROINTESTINAL NEGATIVE: 1

## 2020-03-11 ASSESSMENT — PATIENT HEALTH QUESTIONNAIRE - PHQ9
SUM OF ALL RESPONSES TO PHQ9 QUESTIONS 1 AND 2: 0
SUM OF ALL RESPONSES TO PHQ9 QUESTIONS 1 AND 2: 0
2. FEELING DOWN, DEPRESSED OR HOPELESS: NOT AT ALL
1. LITTLE INTEREST OR PLEASURE IN DOING THINGS: NOT AT ALL

## 2020-03-12 ENCOUNTER — OFFICE VISIT (OUTPATIENT)
Dept: OCCUPATIONAL MEDICINE | Facility: HOSPITAL | Age: 70
End: 2020-03-12
Attending: SURGERY
Payer: MEDICARE

## 2020-03-12 DIAGNOSIS — I63.511 ACUTE ISCHEMIC RIGHT MCA STROKE (HCC): ICD-10-CM

## 2020-03-12 PROCEDURE — 97112 NEUROMUSCULAR REEDUCATION: CPT

## 2020-03-12 NOTE — PROGRESS NOTES
Discharge Summary  Pt has attended 2 visits in Occupational Therapy. Subjective: Pt stated, \"I am still having trouble with these finger, but I am also hoping we can leave to go back to [place] on Monday. \"     Objective:     Cognition: Mildly decrea services at this time. Goals:   Short Term Goals (to be met in 4 sessions)  All goals are ongoing  Pt will complete L UE HEPs with 1 cue for pacing. Pt will improve L hand coordination by 3 seconds for IADL completion.    Pt will increase L hand gr up and rotate items both directions. Difficulty with follow directions appropriately noted, cuing given for proper technique. Poor insight into impact on functioning noted.   Educated both pt and spouse on methods to continue to progress home program.  Th

## 2020-03-13 ENCOUNTER — OFFICE VISIT (OUTPATIENT)
Dept: PHYSICAL THERAPY | Facility: HOSPITAL | Age: 70
End: 2020-03-13
Attending: HOSPITALIST
Payer: MEDICARE

## 2020-03-13 DIAGNOSIS — I63.511 ACUTE ISCHEMIC RIGHT MCA STROKE (HCC): ICD-10-CM

## 2020-03-13 DIAGNOSIS — R41.0 ACUTE CONFUSION: ICD-10-CM

## 2020-03-13 PROCEDURE — 97112 NEUROMUSCULAR REEDUCATION: CPT

## 2020-03-13 PROCEDURE — 97110 THERAPEUTIC EXERCISES: CPT

## 2020-03-13 NOTE — PROGRESS NOTES
Progress/Discharge Summary  Pt has attended 2 visits in Physical Therapy.      Dx: Acute ischemic right MCA stroke ([facility] [address]             Insurance (Authorized # of Visits):  6 requested           Authorizing Physician: Dr. Alyssa Felipe  Next MD visit: none scheduled and PT related complaints. He denies any current issue with balance in his daily life, has been working on his HEP and doing more each day.  He is going up/down the stairs, doing some walking for exercise, has been getting out of the house to go on errands, occurs, may benefit from 1x PT visit for HEP and to discuss safe gym program. He agrees.      Patient/Family/Caregiver was advised of these findings, precautions, and treatment options and has agreed to actively participate in planning and for this course o

## 2020-03-16 ENCOUNTER — OFFICE VISIT (OUTPATIENT)
Dept: NEUROLOGY | Facility: CLINIC | Age: 70
End: 2020-03-16
Payer: MEDICARE

## 2020-03-16 ENCOUNTER — TELEPHONE (OUTPATIENT)
Dept: PHYSICAL THERAPY | Facility: HOSPITAL | Age: 70
End: 2020-03-16

## 2020-03-16 VITALS
WEIGHT: 188 LBS | DIASTOLIC BLOOD PRESSURE: 66 MMHG | SYSTOLIC BLOOD PRESSURE: 125 MMHG | HEART RATE: 63 BPM | HEIGHT: 68 IN | BODY MASS INDEX: 28.49 KG/M2 | RESPIRATION RATE: 16 BRPM

## 2020-03-16 DIAGNOSIS — I63.511 ACUTE ISCHEMIC RIGHT MCA STROKE (HCC): ICD-10-CM

## 2020-03-16 DIAGNOSIS — I65.21 ICAO (INTERNAL CAROTID ARTERY OCCLUSION), RIGHT: Primary | ICD-10-CM

## 2020-03-16 DIAGNOSIS — Z98.890 STATUS POST CAROTID ENDARTERECTOMY: ICD-10-CM

## 2020-03-16 PROCEDURE — 99214 OFFICE O/P EST MOD 30 MIN: CPT | Performed by: OTHER

## 2020-03-16 PROCEDURE — 1111F DSCHRG MED/CURRENT MED MERGE: CPT | Performed by: OTHER

## 2020-03-16 NOTE — PROGRESS NOTES
[facility] with [facility]  1/2/1950  Primary Care Provider:  No primary care provider on file.     3/16/2020  Accompanied visit: wife    () No.        78year old michael ferguson motor and sensory deficits. DTRs were symmetric and no upper motor neuron signs. Coordination was normal.      RELEVANT LABS and other DATA reviewed.  See hospital data  Most importantly the original CT angiogram showed no other intra-or extracranial vessel time.    (x) Discussed potential side effects of any treatment relevant to above. Includes explanation of tests as necessary. No follow-ups on file.   He is moving back to [place]      Patient understands that if needed, based on condition and or test re

## 2020-03-17 ENCOUNTER — APPOINTMENT (OUTPATIENT)
Dept: SPEECH THERAPY | Facility: HOSPITAL | Age: 70
End: 2020-03-17
Attending: HOSPITALIST
Payer: MEDICARE

## 2020-03-18 RX ORDER — MIDODRINE HYDROCHLORIDE 2.5 MG/1
TABLET ORAL
Qty: 270 TABLET | Refills: 0 | Status: SHIPPED | OUTPATIENT
Start: 2020-03-18

## 2020-03-19 ENCOUNTER — APPOINTMENT (OUTPATIENT)
Dept: PHYSICAL THERAPY | Facility: HOSPITAL | Age: 70
End: 2020-03-19
Attending: HOSPITALIST
Payer: MEDICARE

## 2020-03-20 ENCOUNTER — APPOINTMENT (OUTPATIENT)
Dept: PHYSICAL THERAPY | Facility: HOSPITAL | Age: 70
End: 2020-03-20
Attending: HOSPITALIST
Payer: MEDICARE

## 2020-03-24 ENCOUNTER — APPOINTMENT (OUTPATIENT)
Dept: PHYSICAL THERAPY | Facility: HOSPITAL | Age: 70
End: 2020-03-24
Attending: HOSPITALIST
Payer: MEDICARE

## 2020-03-24 ENCOUNTER — APPOINTMENT (OUTPATIENT)
Dept: SPEECH THERAPY | Facility: HOSPITAL | Age: 70
End: 2020-03-24
Attending: HOSPITALIST
Payer: MEDICARE

## 2020-03-26 ENCOUNTER — APPOINTMENT (OUTPATIENT)
Dept: PHYSICAL THERAPY | Facility: HOSPITAL | Age: 70
End: 2020-03-26
Attending: HOSPITALIST
Payer: MEDICARE

## 2020-03-31 ENCOUNTER — APPOINTMENT (OUTPATIENT)
Dept: PHYSICAL THERAPY | Facility: HOSPITAL | Age: 70
End: 2020-03-31
Attending: HOSPITALIST
Payer: MEDICARE

## 2020-04-02 ENCOUNTER — APPOINTMENT (OUTPATIENT)
Dept: OCCUPATIONAL MEDICINE | Facility: HOSPITAL | Age: 70
End: 2020-04-02
Attending: SURGERY
Payer: MEDICARE

## 2020-04-02 ENCOUNTER — APPOINTMENT (OUTPATIENT)
Dept: PHYSICAL THERAPY | Facility: HOSPITAL | Age: 70
End: 2020-04-02
Attending: HOSPITALIST
Payer: MEDICARE

## 2020-04-07 ENCOUNTER — APPOINTMENT (OUTPATIENT)
Dept: PHYSICAL THERAPY | Facility: HOSPITAL | Age: 70
End: 2020-04-07
Attending: HOSPITALIST
Payer: MEDICARE

## 2020-04-07 ENCOUNTER — APPOINTMENT (OUTPATIENT)
Dept: SPEECH THERAPY | Facility: HOSPITAL | Age: 70
End: 2020-04-07
Attending: HOSPITALIST
Payer: MEDICARE

## 2020-04-08 ENCOUNTER — APPOINTMENT (OUTPATIENT)
Dept: OCCUPATIONAL MEDICINE | Facility: HOSPITAL | Age: 70
End: 2020-04-08
Attending: SURGERY
Payer: MEDICARE

## 2020-04-09 ENCOUNTER — APPOINTMENT (OUTPATIENT)
Dept: PHYSICAL THERAPY | Facility: HOSPITAL | Age: 70
End: 2020-04-09
Attending: HOSPITALIST
Payer: MEDICARE

## 2020-04-14 ENCOUNTER — APPOINTMENT (OUTPATIENT)
Dept: SPEECH THERAPY | Facility: HOSPITAL | Age: 70
End: 2020-04-14
Attending: HOSPITALIST
Payer: MEDICARE

## 2020-04-16 ENCOUNTER — APPOINTMENT (OUTPATIENT)
Dept: OCCUPATIONAL MEDICINE | Facility: HOSPITAL | Age: 70
End: 2020-04-16
Attending: SURGERY
Payer: MEDICARE

## 2020-04-21 ENCOUNTER — APPOINTMENT (OUTPATIENT)
Dept: SPEECH THERAPY | Facility: HOSPITAL | Age: 70
End: 2020-04-21
Attending: HOSPITALIST
Payer: MEDICARE

## 2020-04-28 ENCOUNTER — APPOINTMENT (OUTPATIENT)
Dept: SPEECH THERAPY | Facility: HOSPITAL | Age: 70
End: 2020-04-28
Attending: HOSPITALIST
Payer: MEDICARE

## 2020-05-21 RX ORDER — ASPIRIN 325 MG
TABLET, DELAYED RELEASE (ENTERIC COATED) ORAL
Qty: 90 TABLET | Refills: 0 | OUTPATIENT
Start: 2020-05-21

## 2021-02-16 ENCOUNTER — HOSPITAL ENCOUNTER (INPATIENT)
Dept: HOSPITAL 19 - COL.ER | Age: 71
LOS: 4 days | Discharge: HOME | DRG: 872 | End: 2021-02-20
Attending: INTERNAL MEDICINE | Admitting: INTERNAL MEDICINE
Payer: MEDICARE

## 2021-02-16 VITALS — WEIGHT: 190.48 LBS | HEIGHT: 67.99 IN | BODY MASS INDEX: 28.87 KG/M2

## 2021-02-16 VITALS — SYSTOLIC BLOOD PRESSURE: 102 MMHG | HEART RATE: 102 BPM | DIASTOLIC BLOOD PRESSURE: 57 MMHG | TEMPERATURE: 103 F

## 2021-02-16 VITALS — HEART RATE: 97 BPM | SYSTOLIC BLOOD PRESSURE: 119 MMHG | DIASTOLIC BLOOD PRESSURE: 58 MMHG | TEMPERATURE: 101 F

## 2021-02-16 DIAGNOSIS — Z86.73: ICD-10-CM

## 2021-02-16 DIAGNOSIS — N39.0: ICD-10-CM

## 2021-02-16 DIAGNOSIS — N40.0: ICD-10-CM

## 2021-02-16 DIAGNOSIS — Z79.82: ICD-10-CM

## 2021-02-16 DIAGNOSIS — A41.51: Primary | ICD-10-CM

## 2021-02-16 DIAGNOSIS — Z66: ICD-10-CM

## 2021-02-16 DIAGNOSIS — R19.7: ICD-10-CM

## 2021-02-16 DIAGNOSIS — Z20.822: ICD-10-CM

## 2021-02-16 DIAGNOSIS — E78.5: ICD-10-CM

## 2021-02-16 LAB
ALBUMIN SERPL-MCNC: 4.4 GM/DL (ref 3.5–5)
ALP SERPL-CCNC: 112 U/L (ref 50–136)
ALT SERPL-CCNC: 99 U/L (ref 4–49)
ANION GAP SERPL CALC-SCNC: 10 MMOL/L (ref 7–16)
AST SERPL-CCNC: 87 U/L (ref 15–37)
BILIRUB SERPL-MCNC: 1.5 MG/DL (ref 0–1)
BUN SERPL-MCNC: 15 MG/DL (ref 9–20)
CALCIUM SERPL-MCNC: 9.2 MG/DL (ref 8.4–10.2)
CHLORIDE SERPL-SCNC: 103 MMOL/L (ref 98–107)
CO2 SERPL-SCNC: 24 MMOL/L (ref 22–30)
CREAT SERPL-SCNC: 1.21 UMOL/L (ref 0.66–1.25)
CRP SERPL-MCNC: 3.6 MG/DL (ref 0–0.9)
ERYTHROCYTE [DISTWIDTH] IN BLOOD BY AUTOMATED COUNT: 12.3 % (ref 11.5–14.5)
GLUCOSE SERPL-MCNC: 128 MG/DL (ref 74–106)
HCT VFR BLD AUTO: 44.9 % (ref 42–52)
HGB BLD-MCNC: 15.7 G/DL (ref 13.5–18)
LIPASE SERPL-CCNC: 46 U/L (ref 23–300)
LYMPHOCYTES NFR BLD MANUAL: 3 % (ref 20–51)
MCH RBC QN AUTO: 31 PG (ref 27–31)
MCHC RBC AUTO-ENTMCNC: 35 G/DL (ref 33–37)
MCV RBC AUTO: 90 FL (ref 80–100)
MONOCYTES NFR BLD: 7 % (ref 1.7–9.3)
NEUTS BAND NFR BLD: 15 % (ref 0–10)
NEUTS SEG NFR BLD MANUAL: 75 % (ref 42–75.2)
OVALOCYTES BLD QL SMEAR: (no result)
PH UR STRIP.AUTO: 5 [PH] (ref 5–8)
PLATELET # BLD AUTO: 213 K/MM3 (ref 130–400)
PMV BLD AUTO: 9.4 FL (ref 7.4–10.4)
POTASSIUM SERPL-SCNC: 4 MMOL/L (ref 3.4–5)
PROT SERPL-MCNC: 7.5 GM/DL (ref 6.4–8.2)
RBC # BLD AUTO: 5 M/MM3 (ref 4.2–5.6)
RBC # UR: (no result) /HPF
SODIUM SERPL-SCNC: 137 MMOL/L (ref 137–145)
SP GR UR STRIP.AUTO: 1.01 (ref 1–1.03)
TROPONIN I SERPL-MCNC: < 0.012 NG/ML (ref 0–0.04)
URN COLLECT METHOD CLASS: (no result)

## 2021-02-16 PROCEDURE — G0378 HOSPITAL OBSERVATION PER HR: HCPCS

## 2021-02-16 NOTE — NUR
Vancomycin Initial Dosing Pharmacy Note
Ordering provider: Michael Gomez MD
Indication/duration: EMPIRIC
Relevant comorbidities: RECENT
LABS: WBC 19.1, TMAX 101.3, SCr 1.2, CrCl 54
Recommendation: VANCOMYCIN 17 MG/KG
Loading dose: 1.5 grams
Maintenance dose: 1.5 grams every 12 hours
Trough goal: 15-20 ug/mL. TROUGH 2/18 @ 1900

## 2021-02-17 VITALS — DIASTOLIC BLOOD PRESSURE: 56 MMHG | SYSTOLIC BLOOD PRESSURE: 134 MMHG | HEART RATE: 96 BPM | TEMPERATURE: 100.1 F

## 2021-02-17 VITALS — DIASTOLIC BLOOD PRESSURE: 49 MMHG | TEMPERATURE: 103.1 F | HEART RATE: 92 BPM | SYSTOLIC BLOOD PRESSURE: 146 MMHG

## 2021-02-17 VITALS — HEART RATE: 96 BPM | DIASTOLIC BLOOD PRESSURE: 62 MMHG | SYSTOLIC BLOOD PRESSURE: 131 MMHG | TEMPERATURE: 100.9 F

## 2021-02-17 VITALS — HEART RATE: 75 BPM | SYSTOLIC BLOOD PRESSURE: 109 MMHG | TEMPERATURE: 98.7 F | DIASTOLIC BLOOD PRESSURE: 52 MMHG

## 2021-02-17 VITALS — SYSTOLIC BLOOD PRESSURE: 101 MMHG | TEMPERATURE: 98.4 F | HEART RATE: 87 BPM | DIASTOLIC BLOOD PRESSURE: 59 MMHG

## 2021-02-17 VITALS — SYSTOLIC BLOOD PRESSURE: 116 MMHG | DIASTOLIC BLOOD PRESSURE: 56 MMHG | TEMPERATURE: 102.9 F | HEART RATE: 98 BPM

## 2021-02-17 VITALS — TEMPERATURE: 102.2 F

## 2021-02-17 VITALS — TEMPERATURE: 99.9 F

## 2021-02-17 LAB
ALBUMIN SERPL-MCNC: 3.6 GM/DL (ref 3.5–5)
ALP SERPL-CCNC: 87 U/L (ref 50–136)
ALT SERPL-CCNC: 96 U/L (ref 4–49)
ANION GAP SERPL CALC-SCNC: 10 MMOL/L (ref 7–16)
AST SERPL-CCNC: 72 U/L (ref 15–37)
BASOPHILS # BLD: 0 10*3/UL (ref 0–0.2)
BASOPHILS NFR BLD AUTO: 0.2 % (ref 0–2)
BILIRUB SERPL-MCNC: 1.2 MG/DL (ref 0–1)
BUN SERPL-MCNC: 15 MG/DL (ref 9–20)
CALCIUM SERPL-MCNC: 8.2 MG/DL (ref 8.4–10.2)
CHLORIDE SERPL-SCNC: 108 MMOL/L (ref 98–107)
CO2 SERPL-SCNC: 22 MMOL/L (ref 22–30)
CREAT SERPL-SCNC: 1.17 UMOL/L (ref 0.66–1.25)
EOSINOPHIL # BLD: 0 10*3/UL (ref 0–0.7)
EOSINOPHIL NFR BLD: 0 % (ref 0–4)
ERYTHROCYTE [DISTWIDTH] IN BLOOD BY AUTOMATED COUNT: 12.7 % (ref 11.5–14.5)
GLUCOSE SERPL-MCNC: 106 MG/DL (ref 74–106)
GRANULOCYTES # BLD AUTO: 86.2 % (ref 42.2–75.2)
HCT VFR BLD AUTO: 40.2 % (ref 42–52)
HGB BLD-MCNC: 13.6 G/DL (ref 13.5–18)
LYMPHOCYTES # BLD: 1.2 10*3/UL (ref 1.2–3.4)
LYMPHOCYTES NFR BLD: 6.9 % (ref 20–51)
MCH RBC QN AUTO: 32 PG (ref 27–31)
MCHC RBC AUTO-ENTMCNC: 34 G/DL (ref 33–37)
MCV RBC AUTO: 93 FL (ref 80–100)
MONOCYTES # BLD: 1.1 10*3/UL (ref 0.1–0.6)
MONOCYTES NFR BLD AUTO: 6.2 % (ref 1.7–9.3)
NEUTROPHILS # BLD: 15.1 10*3/UL (ref 1.4–6.5)
PLATELET # BLD AUTO: 162 K/MM3 (ref 130–400)
PMV BLD AUTO: 10.4 FL (ref 7.4–10.4)
POTASSIUM SERPL-SCNC: 3.8 MMOL/L (ref 3.4–5)
PROT SERPL-MCNC: 6.3 GM/DL (ref 6.4–8.2)
RBC # BLD AUTO: 4.31 M/MM3 (ref 4.2–5.6)
SODIUM SERPL-SCNC: 140 MMOL/L (ref 137–145)

## 2021-02-17 NOTE — NUR
Shift assessment complete. Pt lying in bed, denies pain or nausea. Mild temp
of 100.9 this AM, no chills. Heart RRR. Lungs CTA. A&Ox4. Reports decreased
appetite. NS running at 150 ml/hr and vanco infusing piggback. Denies other
needs. Call light in reach.

## 2021-02-17 NOTE — NUR
Intermittent fevers today with T-max of 102.9. Temp at this time 100.1. Pt
reporting chills and is visibly shivering. Tylenol not due for another hour
but will administer when due. Reports decreased appetite today and did not
each much. No other complaints. Continuing to monitor.

## 2021-02-17 NOTE — NUR
met with patient and patient's wife, Hope (ph#[phone])
to discuss discharge planning. Patient lives in [place] with his wife
and sees Dr. Nobles for primary care. Patient obtains medications from [facility]
in  with no difficulties. Patient does not use DME and reports independence
with ADLS. Patient reports he has DPO[employer]-HC which designates his wife, Hope
but SW did not locate copy in EMR. Patient plans to return home upon
discharge. SW will continue to follow.

## 2021-02-17 NOTE — NUR
Patient assessed at this time. Alert and oriented x 4, and able to make needs
known. Denies having pain and discomfort at this time. Peripheral IV to left
wrist. Site without redness, warmth, swelling, and pain. Denies having SOB and
dyspnea. LS CTA. Respirations even and unlabored. HRR. Capillary refill less
than 3 seconds. Non-tenting skin turgor. BSAx4. Abdomen soft and non-tender.
No edema. Voices no questions, needs, or concerns at this time. Resting in bed
with call light within reach. Temp 103.1. Given PRN APAP.

## 2021-02-18 VITALS — HEART RATE: 115 BPM | SYSTOLIC BLOOD PRESSURE: 160 MMHG | TEMPERATURE: 100.1 F | DIASTOLIC BLOOD PRESSURE: 79 MMHG

## 2021-02-18 VITALS — SYSTOLIC BLOOD PRESSURE: 160 MMHG | DIASTOLIC BLOOD PRESSURE: 62 MMHG

## 2021-02-18 VITALS — DIASTOLIC BLOOD PRESSURE: 46 MMHG | SYSTOLIC BLOOD PRESSURE: 147 MMHG | HEART RATE: 108 BPM | TEMPERATURE: 98.9 F

## 2021-02-18 VITALS — HEART RATE: 98 BPM | TEMPERATURE: 100.3 F | DIASTOLIC BLOOD PRESSURE: 56 MMHG | SYSTOLIC BLOOD PRESSURE: 140 MMHG

## 2021-02-18 VITALS — HEART RATE: 98 BPM | TEMPERATURE: 102.5 F | DIASTOLIC BLOOD PRESSURE: 56 MMHG | SYSTOLIC BLOOD PRESSURE: 119 MMHG

## 2021-02-18 VITALS — SYSTOLIC BLOOD PRESSURE: 127 MMHG | TEMPERATURE: 97.7 F | DIASTOLIC BLOOD PRESSURE: 53 MMHG | HEART RATE: 65 BPM

## 2021-02-18 LAB
ALBUMIN SERPL-MCNC: 2.8 GM/DL (ref 3.5–5)
ALP SERPL-CCNC: 76 U/L (ref 50–136)
ALT SERPL-CCNC: 75 U/L (ref 4–49)
ANION GAP SERPL CALC-SCNC: 5 MMOL/L (ref 7–16)
AST SERPL-CCNC: 58 U/L (ref 15–37)
BILIRUB SERPL-MCNC: 1.2 MG/DL (ref 0–1)
BUN SERPL-MCNC: 14 MG/DL (ref 9–20)
CALCIUM SERPL-MCNC: 7.8 MG/DL (ref 8.4–10.2)
CHLORIDE SERPL-SCNC: 107 MMOL/L (ref 98–107)
CO2 SERPL-SCNC: 22 MMOL/L (ref 22–30)
CREAT SERPL-SCNC: 1.16 UMOL/L (ref 0.66–1.25)
ERYTHROCYTE [DISTWIDTH] IN BLOOD BY AUTOMATED COUNT: 12.9 % (ref 11.5–14.5)
GLUCOSE SERPL-MCNC: 150 MG/DL (ref 74–106)
HCT VFR BLD AUTO: 33.7 % (ref 42–52)
HETEROPH AB SER QL LA: NEGATIVE
HGB BLD-MCNC: 11.6 G/DL (ref 13.5–18)
MCH RBC QN AUTO: 32 PG (ref 27–31)
MCHC RBC AUTO-ENTMCNC: 34 G/DL (ref 33–37)
MCV RBC AUTO: 92 FL (ref 80–100)
PLATELET # BLD AUTO: 128 K/MM3 (ref 130–400)
PMV BLD AUTO: 9.9 FL (ref 7.4–10.4)
POTASSIUM SERPL-SCNC: 3.4 MMOL/L (ref 3.4–5)
PROT SERPL-MCNC: 5.6 GM/DL (ref 6.4–8.2)
RBC # BLD AUTO: 3.67 M/MM3 (ref 4.2–5.6)
SODIUM SERPL-SCNC: 134 MMOL/L (ref 137–145)

## 2021-02-18 NOTE — NUR
Assessment complete. Paient relaxing in bed on entry. States he was able to
eat a little bit but his appetite still isnt really there. No complaints of
pain or discomfort were expressed. Informed him of student nurse that with him
this morning as well. IV site is CD&I, zosyn currently infusing at this time.
Patient denies other needs at this time. PAtient remains independent with no
issues. Will continue to monitor. Call light is in reach.

## 2021-02-18 NOTE — NUR
Patient has had a good day. he felt really good around lunch and was able to
eat a full meal. Near shift change patient began to feel shivery and minimally
dizzy. PRN tylenol was provided and patient was encouraged to relax and not
attempt to move around too much. Wife currently at the [facility]. PAtient not
wanting to eat dinner at this time, states he will when he feels a little
better. Encouraged patient to call is chills or dizziness got orse. Continuing
to monitor. Call light is in reach.

## 2021-02-18 NOTE — NUR
Awake, alert, oriented x 4, fever noted, reviewed orders for tylenol- not due
yet, adjusted room temp, patient teaching, encouraged po fluids, ambulates
independently, respirations even and unlabored on room air, tolerating IV abt
w/o difficulty.

## 2021-02-18 NOTE — NUR
Patient has been afebrile this shift since receiving PRN APAP at beginning of
shift. Denies having pain and discomfort at this time. Voices no questios,
needs, or concerns at this time. Resting in bed with call light within reach.

## 2021-02-19 VITALS — HEART RATE: 84 BPM | SYSTOLIC BLOOD PRESSURE: 141 MMHG | TEMPERATURE: 98.8 F | DIASTOLIC BLOOD PRESSURE: 66 MMHG

## 2021-02-19 VITALS — SYSTOLIC BLOOD PRESSURE: 117 MMHG | DIASTOLIC BLOOD PRESSURE: 69 MMHG | TEMPERATURE: 99.8 F | HEART RATE: 86 BPM

## 2021-02-19 VITALS — HEART RATE: 74 BPM | TEMPERATURE: 97.9 F | SYSTOLIC BLOOD PRESSURE: 129 MMHG | DIASTOLIC BLOOD PRESSURE: 61 MMHG

## 2021-02-19 VITALS — DIASTOLIC BLOOD PRESSURE: 64 MMHG | SYSTOLIC BLOOD PRESSURE: 130 MMHG

## 2021-02-19 VITALS — TEMPERATURE: 98 F | HEART RATE: 90 BPM | DIASTOLIC BLOOD PRESSURE: 71 MMHG | SYSTOLIC BLOOD PRESSURE: 124 MMHG

## 2021-02-19 VITALS — SYSTOLIC BLOOD PRESSURE: 121 MMHG | DIASTOLIC BLOOD PRESSURE: 64 MMHG

## 2021-02-19 VITALS — SYSTOLIC BLOOD PRESSURE: 126 MMHG | DIASTOLIC BLOOD PRESSURE: 65 MMHG

## 2021-02-19 VITALS — DIASTOLIC BLOOD PRESSURE: 68 MMHG | SYSTOLIC BLOOD PRESSURE: 128 MMHG

## 2021-02-19 VITALS — DIASTOLIC BLOOD PRESSURE: 71 MMHG | SYSTOLIC BLOOD PRESSURE: 127 MMHG

## 2021-02-19 VITALS — DIASTOLIC BLOOD PRESSURE: 72 MMHG | SYSTOLIC BLOOD PRESSURE: 127 MMHG

## 2021-02-19 VITALS — TEMPERATURE: 100.8 F | DIASTOLIC BLOOD PRESSURE: 68 MMHG | SYSTOLIC BLOOD PRESSURE: 145 MMHG | HEART RATE: 90 BPM

## 2021-02-19 VITALS — DIASTOLIC BLOOD PRESSURE: 68 MMHG | SYSTOLIC BLOOD PRESSURE: 121 MMHG

## 2021-02-19 VITALS — SYSTOLIC BLOOD PRESSURE: 122 MMHG | DIASTOLIC BLOOD PRESSURE: 58 MMHG

## 2021-02-19 VITALS — SYSTOLIC BLOOD PRESSURE: 126 MMHG | DIASTOLIC BLOOD PRESSURE: 71 MMHG

## 2021-02-19 VITALS — HEART RATE: 86 BPM | TEMPERATURE: 99.6 F | DIASTOLIC BLOOD PRESSURE: 73 MMHG | SYSTOLIC BLOOD PRESSURE: 150 MMHG

## 2021-02-19 VITALS — TEMPERATURE: 98 F | SYSTOLIC BLOOD PRESSURE: 135 MMHG | DIASTOLIC BLOOD PRESSURE: 66 MMHG | HEART RATE: 82 BPM

## 2021-02-19 LAB
ANION GAP SERPL CALC-SCNC: 6 MMOL/L (ref 7–16)
BASOPHILS # BLD: 0 10*3/UL (ref 0–0.2)
BASOPHILS NFR BLD AUTO: 0.3 % (ref 0–2)
BUN SERPL-MCNC: 10 MG/DL (ref 9–20)
CALCIUM SERPL-MCNC: 8.3 MG/DL (ref 8.4–10.2)
CHLORIDE SERPL-SCNC: 107 MMOL/L (ref 98–107)
CO2 SERPL-SCNC: 24 MMOL/L (ref 22–30)
CREAT SERPL-SCNC: 1.11 UMOL/L (ref 0.66–1.25)
EOSINOPHIL # BLD: 0.1 10*3/UL (ref 0–0.7)
EOSINOPHIL NFR BLD: 1.1 % (ref 0–4)
ERYTHROCYTE [DISTWIDTH] IN BLOOD BY AUTOMATED COUNT: 12.8 % (ref 11.5–14.5)
GLUCOSE SERPL-MCNC: 109 MG/DL (ref 74–106)
GRANULOCYTES # BLD AUTO: 75 % (ref 42.2–75.2)
HCT VFR BLD AUTO: 35.2 % (ref 42–52)
HGB BLD-MCNC: 11.8 G/DL (ref 13.5–18)
LYMPHOCYTES # BLD: 1 10*3/UL (ref 1.2–3.4)
LYMPHOCYTES NFR BLD: 10.7 % (ref 20–51)
MCH RBC QN AUTO: 31 PG (ref 27–31)
MCHC RBC AUTO-ENTMCNC: 34 G/DL (ref 33–37)
MCV RBC AUTO: 92 FL (ref 80–100)
MONOCYTES # BLD: 1.1 10*3/UL (ref 0.1–0.6)
MONOCYTES NFR BLD AUTO: 12.3 % (ref 1.7–9.3)
NEUTROPHILS # BLD: 6.7 10*3/UL (ref 1.4–6.5)
PLATELET # BLD AUTO: 141 K/MM3 (ref 130–400)
PMV BLD AUTO: 10.7 FL (ref 7.4–10.4)
POTASSIUM SERPL-SCNC: 3.5 MMOL/L (ref 3.4–5)
RBC # BLD AUTO: 3.83 M/MM3 (ref 4.2–5.6)
SODIUM SERPL-SCNC: 137 MMOL/L (ref 137–145)

## 2021-02-19 NOTE — NUR
Primary nurse was assisted with [phone] patient care by [facility]
Mikala Sy and [facility] instructor Maye Kamara RN-BC.

## 2021-02-19 NOTE — NUR
Blood pressures monitored Q15 mins for 1 hour after recieving Rocephin due to
past hypertensive allergic reaction. Blood pressures remained within normal
limits, patient reported no issues after recieving the medication.

## 2021-02-19 NOTE — NUR
Awake, sitting up in chair, ambulating around room w/o issue, denies diarrhea,
denied snack at bedtime, VS stable, tolerating antibiotics with decreased
appetite, updated on plan of care.

## 2021-02-19 NOTE — NUR
Assessment complete. Patient resting in bed on entry, wife currently at the
bedsdie. Patient states he is exhausted but is grateful that his fever is
under control for the moment. Patient denies pain or disocmfort. IV site is
currently CD&I, flushed well. Patient states he will try to eat a little bit
of breakfast this morning. Will continue to montior. Call light is in reach.

## 2021-02-19 NOTE — NUR
Patient has had an uneventful shift. No complaints of pain or discomfort. Low
grade temp noted at 1600 vitals but no other temps higher than this. Wife has
been at the bedside all day. PAtient has had minimal needs. [facility] PAtient remains independent in the room. CAll light is in reach.

## 2021-02-20 VITALS — TEMPERATURE: 98.5 F | SYSTOLIC BLOOD PRESSURE: 102 MMHG | HEART RATE: 71 BPM | DIASTOLIC BLOOD PRESSURE: 83 MMHG

## 2021-02-20 VITALS — DIASTOLIC BLOOD PRESSURE: 68 MMHG | TEMPERATURE: 99.2 F | SYSTOLIC BLOOD PRESSURE: 139 MMHG | HEART RATE: 73 BPM

## 2021-02-20 VITALS — TEMPERATURE: 98.1 F | HEART RATE: 64 BPM | SYSTOLIC BLOOD PRESSURE: 139 MMHG | DIASTOLIC BLOOD PRESSURE: 70 MMHG

## 2021-02-20 LAB
ALBUMIN SERPL-MCNC: 3.2 GM/DL (ref 3.5–5)
ALP SERPL-CCNC: 110 U/L (ref 50–136)
ALT SERPL-CCNC: 113 U/L (ref 4–49)
ANION GAP SERPL CALC-SCNC: 5 MMOL/L (ref 7–16)
AST SERPL-CCNC: 90 U/L (ref 15–37)
BILIRUB SERPL-MCNC: 0.9 MG/DL (ref 0–1)
BUN SERPL-MCNC: 12 MG/DL (ref 9–20)
CALCIUM SERPL-MCNC: 8.6 MG/DL (ref 8.4–10.2)
CHLORIDE SERPL-SCNC: 109 MMOL/L (ref 98–107)
CO2 SERPL-SCNC: 26 MMOL/L (ref 22–30)
CREAT SERPL-SCNC: 1 UMOL/L (ref 0.66–1.25)
ERYTHROCYTE [DISTWIDTH] IN BLOOD BY AUTOMATED COUNT: 12.6 % (ref 11.5–14.5)
GLUCOSE SERPL-MCNC: 103 MG/DL (ref 74–106)
HCT VFR BLD AUTO: 34.2 % (ref 42–52)
HGB BLD-MCNC: 11.6 G/DL (ref 13.5–18)
MCH RBC QN AUTO: 31 PG (ref 27–31)
MCHC RBC AUTO-ENTMCNC: 34 G/DL (ref 33–37)
MCV RBC AUTO: 91 FL (ref 80–100)
PLATELET # BLD AUTO: 167 K/MM3 (ref 130–400)
PMV BLD AUTO: 10.2 FL (ref 7.4–10.4)
POTASSIUM SERPL-SCNC: 3.7 MMOL/L (ref 3.4–5)
PROT SERPL-MCNC: 6.1 GM/DL (ref 6.4–8.2)
RBC # BLD AUTO: 3.78 M/MM3 (ref 4.2–5.6)
SODIUM SERPL-SCNC: 140 MMOL/L (ref 137–145)

## 2021-02-20 NOTE — NUR
Patient laying in bed, A&Ox4. VSS. IV CDI. Denies pain and discomfort. Patient
NPO for a procedure. No further needs expressed from the patient. Call light
within reach

## 2021-02-20 NOTE — NUR
Patient ambulated independently with nursing staff and wife to vehicle.
Discharge paperwork and personal belongings with the patient.

## 2021-02-20 NOTE — NUR
Discharge paperwork reviewed with the patient. Patient verbalized an
understanding to follow doctors orders. IV removed, tip intact, gauze and
coban applied. No further needs expressed from the patient.

## 2021-03-12 DIAGNOSIS — Z23 NEED FOR VACCINATION: ICD-10-CM

## 2022-12-02 NOTE — DIETARY NOTE
Acwinnie  Nadja     Admitting diagnosis:  Acute confusion [R41.0]    Ht: 172.7 cm (5' 8\")  Wt: 85.6 kg (188 lb 11.4 oz). This is 122 % of IBW  Body mass index is 28.69 kg/m².   IBW: 70 kg    Labs/Meds reviewed
intact

## (undated) DEVICE — GEL AQUASONIC 100 20GR

## (undated) DEVICE — 9F PRUITT F3 OUTLYING SHUNT WITH T-PORT, EIFU
Type: IMPLANTABLE DEVICE | Site: NECK | Status: NON-FUNCTIONAL
Brand: PRUITT F3 CAROTID SHUNT
Removed: 2020-02-20

## (undated) DEVICE — TRANSPOSAL ULTRAFLEX DUO/QUAD ULTRA CART MANIFOLD

## (undated) DEVICE — 3M™ IOBAN™ 2 ANTIMICROBIAL INCISE DRAPE 6650EZ: Brand: IOBAN™ 2

## (undated) DEVICE — HEMOCLIP MED 24 CLIP/CARTRIDGE

## (undated) DEVICE — SUTURE PROLENE 6-0 C-1

## (undated) DEVICE — SUTURE PROLENE 7-0 BV-1

## (undated) DEVICE — CHLORAPREP 26ML APPLICATOR

## (undated) DEVICE — SUTURE MONOCRYL 4-0 PS-2

## (undated) DEVICE — HEMOCLIP HORIZON SM MULTI

## (undated) DEVICE — SURGICAL POWDER 3.0G

## (undated) DEVICE — SOL  .9 1000ML BTL

## (undated) DEVICE — SUTURE SILK 3-0

## (undated) DEVICE — SUTURE VICRYL 3-0 SH

## (undated) DEVICE — STERILE POLYISOPRENE POWDER-FREE SURGICAL GLOVES: Brand: PROTEXIS

## (undated) DEVICE — SUTURE SILK 2-0

## (undated) DEVICE — BASIC DOUBLE BASIN 1-LF: Brand: MEDLINE INDUSTRIES, INC.

## (undated) DEVICE — Device

## (undated) DEVICE — CV PACK-LF: Brand: MEDLINE INDUSTRIES, INC.

## (undated) DEVICE — FIXED CORE WIRE GUIDE SAFE-T-J, CURVED: Brand: COOK

## (undated) DEVICE — STANDARD HYPODERMIC NEEDLE,POLYPROPYLENE HUB: Brand: MONOJECT

## (undated) DEVICE — SYRINGE 10ML LL TIP

## (undated) DEVICE — DECANTER BAG 9": Brand: MEDLINE INDUSTRIES, INC.

## (undated) DEVICE — X-RAY DETECTABLE SPONGES,16 PLY: Brand: VISTEC

## (undated) DEVICE — GOWN SURG AERO CHROME XXL

## (undated) DEVICE — SUTURE SILK 0 FSL

## (undated) DEVICE — INTENDED TO BE USED TO OCCLUDE, RETRACT AND IDENTIFY ARTERIES, VEINS, TENDONS AND NERVES IN SURGICAL PROCEDURES: Brand: STERION®  VESSEL LOOP

## (undated) DEVICE — SKIN AFFIX .4ML

## (undated) NOTE — LETTER
Coreen Menendez [id number]  [place], [address] [facility]    Consent for Operation  Date: __________________                                Time: _______________    1.  I authorize the performance upon Reese Offer the following operation:  Pro procedure has been videotaped, the surgeon will obtain the original videotape. The hospital will not be responsible for storage or maintenance of this tape.   6. For the purpose of advancing medical education, I consent to the admittance of observers to the STATEMENTS REQUIRING INSERTION OR COMPLETION WERE FILLED IN.     Signature of Patient:   ___________________________    When the patient is a minor or mentally incompetent to give consent:  Signature of person authorized to consent for patient: ____________ supplements, and pills I can buy without a prescription (including street drugs/illegal medications). Failure to inform my anesthesiologist about these medicines may increase my risk of anesthetic complications. iv.  If I am allergic to anything or have ha Anesthesiologist Signature     Date   Time  I have discussed the procedure and information above with the patient (or patient’s representative) and answered their questions. The patient or their representative has agreed to have anesthesia services.     ___

## (undated) NOTE — LETTER
Patient Name: Troy Arteaga  YOB: 1950          MRN :  [id number]  Date:  3/14/2020  Referring Physician:  Tani Medrano    Progress/Discharge Summary  Pt has attended 2 visits in Physical Therapy.      Dx: Acute ischemic right MCA stro Outdoor and dynamic/compliant surface ambulation: as listed in assessment and flowsheet (3/13/2020)      Assessment: Patient doing very well in regards to PT goals and PT related complaints.  He denies any current issue with balance in his daily life, has b Plan: Patient has met or generally met all goals at this time and is doing well. Would recommend continued independent HEP and return to gym program upon return home.  When this occurs, may benefit from 1x PT visit for HEP and to discuss safe gym program. H